# Patient Record
Sex: FEMALE | Race: BLACK OR AFRICAN AMERICAN | NOT HISPANIC OR LATINO | Employment: FULL TIME | RURAL
[De-identification: names, ages, dates, MRNs, and addresses within clinical notes are randomized per-mention and may not be internally consistent; named-entity substitution may affect disease eponyms.]

---

## 2019-12-18 ENCOUNTER — HISTORICAL (OUTPATIENT)
Dept: ADMINISTRATIVE | Facility: HOSPITAL | Age: 39
End: 2019-12-18

## 2019-12-23 LAB
LAB AP CLINICAL INFORMATION: NORMAL
LAB AP GENERAL CAT - HISTORICAL: NORMAL
LAB AP INTERPRETATION/RESULT - HISTORICAL: NORMAL
LAB AP SPECIMEN ADEQUACY - HISTORICAL: NORMAL
LAB AP SPECIMEN SUBMITTED - HISTORICAL: NORMAL

## 2020-01-03 LAB
HPV 16/18: NORMAL
HPV OTHER: NEGATIVE
PAP RECOMMENDATION EXT: NORMAL
THIN PREP: NORMAL

## 2020-06-09 ENCOUNTER — HISTORICAL (OUTPATIENT)
Dept: ADMINISTRATIVE | Facility: HOSPITAL | Age: 40
End: 2020-06-09

## 2020-07-16 ENCOUNTER — HISTORICAL (OUTPATIENT)
Dept: ADMINISTRATIVE | Facility: HOSPITAL | Age: 40
End: 2020-07-16

## 2020-07-16 LAB
ANION GAP SERPL CALCULATED.3IONS-SCNC: 10 MMOL/L
BASOPHILS # BLD AUTO: 0.02 X10E3/UL (ref 0–0.2)
BASOPHILS NFR BLD AUTO: 0.4 % (ref 0–1)
BUN SERPL-MCNC: 11 MG/DL (ref 7–18)
CALCIUM SERPL-MCNC: 8.4 MG/DL (ref 8.5–10.1)
CHLORIDE SERPL-SCNC: 105 MMOL/L (ref 98–107)
CO2 SERPL-SCNC: 26 MMOL/L (ref 21–32)
CREAT SERPL-MCNC: 1.06 MG/DL (ref 0.55–1.02)
EOSINOPHIL # BLD AUTO: 0.14 X10E3/UL (ref 0–0.5)
EOSINOPHIL NFR BLD AUTO: 2.5 % (ref 1–4)
ERYTHROCYTE [DISTWIDTH] IN BLOOD BY AUTOMATED COUNT: 12.2 % (ref 11.5–14.5)
GLUCOSE SERPL-MCNC: 89 MG/DL (ref 74–106)
HCT VFR BLD AUTO: 40 % (ref 38–47)
HGB BLD-MCNC: 13.7 G/DL (ref 12–16)
IMM GRANULOCYTES # BLD AUTO: 0.02 X10E3/UL (ref 0–0.04)
IMM GRANULOCYTES NFR BLD: 0.4 % (ref 0–0.4)
LYMPHOCYTES # BLD AUTO: 1.8 X10E3/UL (ref 1–4.8)
LYMPHOCYTES NFR BLD AUTO: 32.3 % (ref 27–41)
MCH RBC QN AUTO: 31.9 PG (ref 27–31)
MCHC RBC AUTO-ENTMCNC: 34.3 G/DL (ref 32–36)
MCV RBC AUTO: 93.2 FL (ref 80–96)
MONOCYTES # BLD AUTO: 0.71 X10E3/UL (ref 0–0.8)
MONOCYTES NFR BLD AUTO: 12.7 % (ref 2–6)
MPC BLD CALC-MCNC: 10.2 FL (ref 9.4–12.4)
NEUTROPHILS # BLD AUTO: 2.89 X10E3/UL (ref 1.8–7.7)
NEUTROPHILS NFR BLD AUTO: 51.7 % (ref 53–65)
NRBC # BLD AUTO: 0 X10E3/UL (ref 0–0)
NRBC, AUTO (.00): 0 /100 (ref 0–0)
PLATELET # BLD AUTO: 154 X10E3/UL (ref 150–400)
POTASSIUM SERPL-SCNC: 3.7 MMOL/L (ref 3.5–5.1)
RBC # BLD AUTO: 4.29 X10E6/UL (ref 4.2–5.4)
SODIUM SERPL-SCNC: 137 MMOL/L (ref 136–145)
WBC # BLD AUTO: 5.58 X10E3/UL (ref 4.5–11)

## 2020-07-17 ENCOUNTER — HISTORICAL (OUTPATIENT)
Dept: ADMINISTRATIVE | Facility: HOSPITAL | Age: 40
End: 2020-07-17

## 2020-09-15 ENCOUNTER — HISTORICAL (OUTPATIENT)
Dept: ADMINISTRATIVE | Facility: HOSPITAL | Age: 40
End: 2020-09-15

## 2020-09-15 LAB — SARS-COV+SARS-COV-2 AG RESP QL IA.RAPID: NEGATIVE

## 2021-03-16 ENCOUNTER — TELEPHONE (OUTPATIENT)
Dept: OBSTETRICS AND GYNECOLOGY | Facility: CLINIC | Age: 41
End: 2021-03-16

## 2021-10-07 ENCOUNTER — HOSPITAL ENCOUNTER (OUTPATIENT)
Dept: RADIOLOGY | Facility: HOSPITAL | Age: 41
Discharge: HOME | End: 2021-10-07
Payer: COMMERCIAL

## 2021-10-07 VITALS — BODY MASS INDEX: 27.99 KG/M2 | WEIGHT: 168 LBS | HEIGHT: 65 IN

## 2021-10-07 DIAGNOSIS — Z12.31 OTHER SCREENING MAMMOGRAM: ICD-10-CM

## 2021-10-07 PROCEDURE — 77067 SCR MAMMO BI INCL CAD: CPT | Mod: TC

## 2021-10-17 ENCOUNTER — HOSPITAL ENCOUNTER (EMERGENCY)
Facility: HOSPITAL | Age: 41
Discharge: HOME | End: 2021-10-17
Payer: COMMERCIAL

## 2021-10-17 VITALS
HEIGHT: 66 IN | OXYGEN SATURATION: 100 % | WEIGHT: 172 LBS | DIASTOLIC BLOOD PRESSURE: 82 MMHG | RESPIRATION RATE: 16 BRPM | HEART RATE: 78 BPM | SYSTOLIC BLOOD PRESSURE: 136 MMHG | TEMPERATURE: 98 F | BODY MASS INDEX: 27.64 KG/M2

## 2021-10-17 DIAGNOSIS — M79.605 LEFT LEG PAIN: ICD-10-CM

## 2021-10-17 LAB
ANION GAP SERPL CALCULATED.3IONS-SCNC: 11 MMOL/L (ref 7–16)
APTT PPP: 34.8 SECONDS (ref 25.2–37.3)
BASOPHILS # BLD AUTO: 0.02 K/UL (ref 0–0.2)
BASOPHILS NFR BLD AUTO: 0.3 % (ref 0–1)
BUN SERPL-MCNC: 12 MG/DL (ref 7–18)
BUN/CREAT SERPL: 13 (ref 6–20)
CALCIUM SERPL-MCNC: 8.6 MG/DL (ref 8.5–10.1)
CHLORIDE SERPL-SCNC: 107 MMOL/L (ref 98–107)
CO2 SERPL-SCNC: 29 MMOL/L (ref 21–32)
CREAT SERPL-MCNC: 0.89 MG/DL (ref 0.55–1.02)
DIFFERENTIAL METHOD BLD: ABNORMAL
EOSINOPHIL # BLD AUTO: 0.1 K/UL (ref 0–0.5)
EOSINOPHIL NFR BLD AUTO: 1.6 % (ref 1–4)
ERYTHROCYTE [DISTWIDTH] IN BLOOD BY AUTOMATED COUNT: 11.9 % (ref 11.5–14.5)
GLUCOSE SERPL-MCNC: 83 MG/DL (ref 74–106)
HCT VFR BLD AUTO: 38.5 % (ref 38–47)
HGB BLD-MCNC: 13.5 G/DL (ref 12–16)
IMM GRANULOCYTES # BLD AUTO: 0.01 K/UL (ref 0–0.04)
IMM GRANULOCYTES NFR BLD: 0.2 % (ref 0–0.4)
INR BLD: 0.97 (ref 0.9–1.1)
LYMPHOCYTES # BLD AUTO: 1.9 K/UL (ref 1–4.8)
LYMPHOCYTES NFR BLD AUTO: 30.3 % (ref 27–41)
MCH RBC QN AUTO: 31.3 PG (ref 27–31)
MCHC RBC AUTO-ENTMCNC: 35.1 G/DL (ref 32–36)
MCV RBC AUTO: 89.3 FL (ref 80–96)
MONOCYTES # BLD AUTO: 0.58 K/UL (ref 0–0.8)
MONOCYTES NFR BLD AUTO: 9.3 % (ref 2–6)
MPC BLD CALC-MCNC: 9.2 FL (ref 9.4–12.4)
NEUTROPHILS # BLD AUTO: 3.66 K/UL (ref 1.8–7.7)
NEUTROPHILS NFR BLD AUTO: 58.3 % (ref 53–65)
NRBC # BLD AUTO: 0 X10E3/UL
NRBC, AUTO (.00): 0 %
PLATELET # BLD AUTO: 149 K/UL (ref 150–400)
POTASSIUM SERPL-SCNC: 4.3 MMOL/L (ref 3.5–5.1)
PROTHROMBIN TIME: 12.9 SECONDS (ref 11.7–14.7)
RBC # BLD AUTO: 4.31 M/UL (ref 4.2–5.4)
SODIUM SERPL-SCNC: 143 MMOL/L (ref 136–145)
WBC # BLD AUTO: 6.27 K/UL (ref 4.5–11)

## 2021-10-17 PROCEDURE — 36415 COLL VENOUS BLD VENIPUNCTURE: CPT | Performed by: NURSE PRACTITIONER

## 2021-10-17 PROCEDURE — 99282 PR EMERGENCY DEPT VISIT,LEVEL II: ICD-10-PCS | Mod: ,,, | Performed by: NURSE PRACTITIONER

## 2021-10-17 PROCEDURE — 80048 BASIC METABOLIC PNL TOTAL CA: CPT | Performed by: NURSE PRACTITIONER

## 2021-10-17 PROCEDURE — 85610 PROTHROMBIN TIME: CPT | Performed by: NURSE PRACTITIONER

## 2021-10-17 PROCEDURE — 99282 EMERGENCY DEPT VISIT SF MDM: CPT | Mod: ,,, | Performed by: NURSE PRACTITIONER

## 2021-10-17 PROCEDURE — 85025 COMPLETE CBC W/AUTO DIFF WBC: CPT | Performed by: NURSE PRACTITIONER

## 2021-10-17 PROCEDURE — 99284 EMERGENCY DEPT VISIT MOD MDM: CPT | Mod: 25

## 2021-10-17 PROCEDURE — 85730 THROMBOPLASTIN TIME PARTIAL: CPT | Performed by: NURSE PRACTITIONER

## 2021-10-17 RX ORDER — NAPROXEN SODIUM 220 MG/1
81 TABLET, FILM COATED ORAL DAILY
COMMUNITY

## 2022-04-28 ENCOUNTER — OFFICE VISIT (OUTPATIENT)
Dept: FAMILY MEDICINE | Facility: CLINIC | Age: 42
End: 2022-04-28
Payer: COMMERCIAL

## 2022-04-28 VITALS
HEART RATE: 78 BPM | WEIGHT: 171 LBS | HEIGHT: 66 IN | BODY MASS INDEX: 27.48 KG/M2 | TEMPERATURE: 98 F | OXYGEN SATURATION: 99 % | SYSTOLIC BLOOD PRESSURE: 126 MMHG | RESPIRATION RATE: 18 BRPM | DIASTOLIC BLOOD PRESSURE: 72 MMHG

## 2022-04-28 DIAGNOSIS — K04.7 ABSCESSED TOOTH: Primary | ICD-10-CM

## 2022-04-28 PROCEDURE — 96372 THER/PROPH/DIAG INJ SC/IM: CPT | Mod: ,,, | Performed by: NURSE PRACTITIONER

## 2022-04-28 PROCEDURE — 96372 PR INJECTION,THERAP/PROPH/DIAG2ST, IM OR SUBCUT: ICD-10-PCS | Mod: ,,, | Performed by: NURSE PRACTITIONER

## 2022-04-28 PROCEDURE — 3008F BODY MASS INDEX DOCD: CPT | Mod: ,,, | Performed by: NURSE PRACTITIONER

## 2022-04-28 PROCEDURE — 99213 OFFICE O/P EST LOW 20 MIN: CPT | Mod: 25,,, | Performed by: NURSE PRACTITIONER

## 2022-04-28 PROCEDURE — 1159F PR MEDICATION LIST DOCUMENTED IN MEDICAL RECORD: ICD-10-PCS | Mod: ,,, | Performed by: NURSE PRACTITIONER

## 2022-04-28 PROCEDURE — 3074F PR MOST RECENT SYSTOLIC BLOOD PRESSURE < 130 MM HG: ICD-10-PCS | Mod: ,,, | Performed by: NURSE PRACTITIONER

## 2022-04-28 PROCEDURE — 1160F PR REVIEW ALL MEDS BY PRESCRIBER/CLIN PHARMACIST DOCUMENTED: ICD-10-PCS | Mod: ,,, | Performed by: NURSE PRACTITIONER

## 2022-04-28 PROCEDURE — 1159F MED LIST DOCD IN RCRD: CPT | Mod: ,,, | Performed by: NURSE PRACTITIONER

## 2022-04-28 PROCEDURE — 1160F RVW MEDS BY RX/DR IN RCRD: CPT | Mod: ,,, | Performed by: NURSE PRACTITIONER

## 2022-04-28 PROCEDURE — 3078F PR MOST RECENT DIASTOLIC BLOOD PRESSURE < 80 MM HG: ICD-10-PCS | Mod: ,,, | Performed by: NURSE PRACTITIONER

## 2022-04-28 PROCEDURE — 3008F PR BODY MASS INDEX (BMI) DOCUMENTED: ICD-10-PCS | Mod: ,,, | Performed by: NURSE PRACTITIONER

## 2022-04-28 PROCEDURE — 99213 PR OFFICE/OUTPT VISIT, EST, LEVL III, 20-29 MIN: ICD-10-PCS | Mod: 25,,, | Performed by: NURSE PRACTITIONER

## 2022-04-28 PROCEDURE — 3074F SYST BP LT 130 MM HG: CPT | Mod: ,,, | Performed by: NURSE PRACTITIONER

## 2022-04-28 PROCEDURE — 3078F DIAST BP <80 MM HG: CPT | Mod: ,,, | Performed by: NURSE PRACTITIONER

## 2022-04-28 RX ORDER — KETOROLAC TROMETHAMINE 30 MG/ML
60 INJECTION, SOLUTION INTRAMUSCULAR; INTRAVENOUS
Status: COMPLETED | OUTPATIENT
Start: 2022-04-28 | End: 2022-04-28

## 2022-04-28 RX ORDER — AMOXICILLIN 500 MG/1
500 TABLET, FILM COATED ORAL EVERY 12 HOURS
Qty: 21 TABLET | Refills: 0 | Status: SHIPPED | OUTPATIENT
Start: 2022-04-28 | End: 2022-05-08

## 2022-04-28 RX ORDER — CEFTRIAXONE 1 G/1
1 INJECTION, POWDER, FOR SOLUTION INTRAMUSCULAR; INTRAVENOUS
Status: COMPLETED | OUTPATIENT
Start: 2022-04-28 | End: 2022-04-28

## 2022-04-28 RX ADMIN — CEFTRIAXONE 1 G: 1 INJECTION, POWDER, FOR SOLUTION INTRAMUSCULAR; INTRAVENOUS at 02:04

## 2022-04-28 RX ADMIN — KETOROLAC TROMETHAMINE 60 MG: 30 INJECTION, SOLUTION INTRAMUSCULAR; INTRAVENOUS at 02:04

## 2022-04-28 NOTE — PROGRESS NOTES
Subjective:       Patient ID: Genoveva Ortiz is a 42 y.o. female.    Chief Complaint: Dental Pain    Ms. Ortiz presents to clinic with complaints of tooth pain with facial swelling onset 3-4 days ago, she is scheduled to see her dentist in one week. She reports difficulty sleeping and working due to pain in left jaw, left ear and left neck - she denies fever, chills, or drainage from the tooth.    Review of Systems   Constitutional: Negative for chills and fever.   HENT: Positive for dental problem, ear pain and facial swelling. Negative for drooling and mouth sores.    Respiratory: Negative.    Cardiovascular: Negative.    Gastrointestinal: Negative.    Genitourinary: Negative.    Neurological: Negative.    Psychiatric/Behavioral: Negative.          Objective:      Physical Exam  Vitals and nursing note reviewed.   Constitutional:       General: She is not in acute distress.     Appearance: Normal appearance.   HENT:      Head: Normocephalic.      Mouth/Throat:      Lips: Pink.      Mouth: Mucous membranes are moist. No oral lesions.      Dentition: Dental tenderness present. No gingival swelling, dental abscesses or gum lesions.      Tongue: No lesions.      Pharynx: Oropharynx is clear.   Eyes:      Conjunctiva/sclera: Conjunctivae normal.      Pupils: Pupils are equal, round, and reactive to light.   Neck:        Comments: Swelling and tenderness to palpation to bottom right molar area  Cardiovascular:      Rate and Rhythm: Normal rate and regular rhythm.      Pulses: Normal pulses.      Heart sounds: Normal heart sounds.   Pulmonary:      Effort: Pulmonary effort is normal.      Breath sounds: Normal breath sounds.   Abdominal:      General: Bowel sounds are normal.      Palpations: Abdomen is soft.   Musculoskeletal:         General: Normal range of motion.   Skin:     General: Skin is warm and dry.   Neurological:      Mental Status: She is alert and oriented to person, place, and time.    Psychiatric:         Behavior: Behavior normal.         Assessment:       Problem List Items Addressed This Visit    None     Visit Diagnoses     Abscessed tooth    -  Primary          Plan:         1. Rocephin 1 gm IM, Toradol 60mg IM today, start Amoxicillin 500mg TID with food. Keep DMD appointment in one week, RTC if symptoms worsen or persist.

## 2022-08-09 ENCOUNTER — OFFICE VISIT (OUTPATIENT)
Dept: OBSTETRICS AND GYNECOLOGY | Facility: CLINIC | Age: 42
End: 2022-08-09
Payer: COMMERCIAL

## 2022-08-09 VITALS — HEIGHT: 65 IN | DIASTOLIC BLOOD PRESSURE: 70 MMHG | SYSTOLIC BLOOD PRESSURE: 122 MMHG | BODY MASS INDEX: 28.46 KG/M2

## 2022-08-09 DIAGNOSIS — Z12.4 SCREENING FOR MALIGNANT NEOPLASM OF THE CERVIX: Primary | ICD-10-CM

## 2022-08-09 DIAGNOSIS — R87.610 ATYPICAL SQUAMOUS CELL CHANGES OF UNDETERMINED SIGNIFICANCE (ASCUS) ON CERVICAL CYTOLOGY WITH NEGATIVE HIGH RISK HUMAN PAPILLOMA VIRUS (HPV) TEST RESULT: ICD-10-CM

## 2022-08-09 PROCEDURE — 3008F PR BODY MASS INDEX (BMI) DOCUMENTED: ICD-10-PCS | Mod: ,,, | Performed by: OBSTETRICS & GYNECOLOGY

## 2022-08-09 PROCEDURE — 3074F SYST BP LT 130 MM HG: CPT | Mod: ,,, | Performed by: OBSTETRICS & GYNECOLOGY

## 2022-08-09 PROCEDURE — 1159F MED LIST DOCD IN RCRD: CPT | Mod: ,,, | Performed by: OBSTETRICS & GYNECOLOGY

## 2022-08-09 PROCEDURE — 3074F PR MOST RECENT SYSTOLIC BLOOD PRESSURE < 130 MM HG: ICD-10-PCS | Mod: ,,, | Performed by: OBSTETRICS & GYNECOLOGY

## 2022-08-09 PROCEDURE — 88142 CYTOPATH C/V THIN LAYER: CPT | Mod: GCY | Performed by: OBSTETRICS & GYNECOLOGY

## 2022-08-09 PROCEDURE — 87624 HPV HI-RISK TYP POOLED RSLT: CPT | Mod: ,,, | Performed by: CLINICAL MEDICAL LABORATORY

## 2022-08-09 PROCEDURE — 3078F DIAST BP <80 MM HG: CPT | Mod: ,,, | Performed by: OBSTETRICS & GYNECOLOGY

## 2022-08-09 PROCEDURE — 3078F PR MOST RECENT DIASTOLIC BLOOD PRESSURE < 80 MM HG: ICD-10-PCS | Mod: ,,, | Performed by: OBSTETRICS & GYNECOLOGY

## 2022-08-09 PROCEDURE — 3008F BODY MASS INDEX DOCD: CPT | Mod: ,,, | Performed by: OBSTETRICS & GYNECOLOGY

## 2022-08-09 PROCEDURE — 99402 PREV MED CNSL INDIV APPRX 30: CPT | Mod: S$PBB,,, | Performed by: OBSTETRICS & GYNECOLOGY

## 2022-08-09 PROCEDURE — 1159F PR MEDICATION LIST DOCUMENTED IN MEDICAL RECORD: ICD-10-PCS | Mod: ,,, | Performed by: OBSTETRICS & GYNECOLOGY

## 2022-08-09 PROCEDURE — 99213 OFFICE O/P EST LOW 20 MIN: CPT | Mod: PBBFAC | Performed by: OBSTETRICS & GYNECOLOGY

## 2022-08-09 PROCEDURE — 99402 PR PREVENT COUNSEL,INDIV,30 MIN: ICD-10-PCS | Mod: S$PBB,,, | Performed by: OBSTETRICS & GYNECOLOGY

## 2022-08-09 PROCEDURE — 87624 HUMAN PAPILLOMAVIRUS (HPV): ICD-10-PCS | Mod: ,,, | Performed by: CLINICAL MEDICAL LABORATORY

## 2022-08-09 NOTE — PROGRESS NOTES
Subjective:       Patient ID: Genoveva Ortiz is a 42 y.o. female.    Chief Complaint: Annual Exam (Here for check up  and pap.)    Presents for yearly gyn check.    She was last seen December 2019.  Ascus Pap however HPV negative at that time.    She has had an ablation in the past.  However last month she did have a light 3 day menses.    She will notify me if any irregular vaginal bleeding throughout the month or if any heavy vaginal bleeding occurs        Review of Systems      Objective:      Physical Exam  Exam conducted with a chaperone present.   HENT:      Head: Normocephalic.      Nose: Nose normal.   Eyes:      Pupils: Pupils are equal, round, and reactive to light.   Cardiovascular:      Rate and Rhythm: Normal rate.   Pulmonary:      Effort: Pulmonary effort is normal.      Breath sounds: Normal breath sounds.   Chest:      Comments: Breasts without palpable masses no skin retraction or nipple dimpling.  She had mammography screening September 2021 repeat scheduled  Abdominal:      General: Abdomen is flat.      Palpations: Abdomen is soft.   Genitourinary:     General: Normal vulva.      Vagina: Normal.      Cervix: Normal.      Uterus: Normal.       Adnexa: Right adnexa normal and left adnexa normal.      Rectum: Normal.      Comments: External normal vault normal cervix normal to appearance Pap taken bimanual exam revealed uterus normal size ovaries are nonpalpable on vaginal or rectovaginal examination no family history of colon cancer no history of rectal bleeding discussed beginning colon screening at age 46.   Musculoskeletal:         General: Normal range of motion.      Cervical back: Full passive range of motion without pain, normal range of motion and neck supple.   Skin:     General: Skin is dry.   Neurological:      General: No focal deficit present.      Mental Status: She is alert.   Psychiatric:         Attention and Perception: Attention normal.         Mood and Affect: Mood  normal.         Assessment:       1. Screening for malignant neoplasm of the cervix    2. Atypical squamous cell changes of undetermined significance (ASCUS) on cervical cytology with negative high risk human papilloma virus (HPV) test result        Plan:       Patient Instructions   Discussed normal gyn examination.    Discussed continuing yearly screening mammography    Began colon screening at age 46.    Routine glucose cholesterol testing by primary care provider.

## 2022-08-09 NOTE — PATIENT INSTRUCTIONS
Discussed normal gyn examination.    Discussed continuing yearly screening mammography    Began colon screening at age 46.    Routine glucose cholesterol testing by primary care provider.

## 2022-08-12 LAB
GH SERPL-MCNC: NORMAL NG/ML
INSULIN SERPL-ACNC: NORMAL U[IU]/ML
LAB AP CLINICAL INFORMATION: NORMAL
LAB AP GYN INTERPRETATION: NEGATIVE
LAB AP PAP DISCLAIMER COMMENTS: NORMAL
RENIN PLAS-CCNC: NORMAL NG/ML/H

## 2022-08-17 LAB
HPV 16: NEGATIVE
HPV 18: NEGATIVE
HPV OTHER: NEGATIVE

## 2022-10-12 ENCOUNTER — HOSPITAL ENCOUNTER (OUTPATIENT)
Dept: RADIOLOGY | Facility: HOSPITAL | Age: 42
Discharge: HOME | End: 2022-10-12
Attending: OBSTETRICS & GYNECOLOGY
Payer: COMMERCIAL

## 2022-10-12 VITALS — HEIGHT: 65 IN | WEIGHT: 179 LBS | BODY MASS INDEX: 29.82 KG/M2

## 2022-10-12 DIAGNOSIS — Z12.31 OTHER SCREENING MAMMOGRAM: ICD-10-CM

## 2022-10-12 PROCEDURE — 77067 SCR MAMMO BI INCL CAD: CPT | Mod: TC

## 2023-04-17 ENCOUNTER — OFFICE VISIT (OUTPATIENT)
Dept: VASCULAR SURGERY | Facility: CLINIC | Age: 43
End: 2023-04-17
Payer: COMMERCIAL

## 2023-04-17 ENCOUNTER — HOSPITAL ENCOUNTER (OUTPATIENT)
Dept: RADIOLOGY | Facility: HOSPITAL | Age: 43
Discharge: HOME | End: 2023-04-17
Attending: FAMILY MEDICINE
Payer: COMMERCIAL

## 2023-04-17 VITALS
DIASTOLIC BLOOD PRESSURE: 70 MMHG | RESPIRATION RATE: 14 BRPM | HEIGHT: 65 IN | WEIGHT: 174 LBS | SYSTOLIC BLOOD PRESSURE: 124 MMHG | HEART RATE: 76 BPM | BODY MASS INDEX: 28.99 KG/M2

## 2023-04-17 DIAGNOSIS — M79.605 LEFT LEG PAIN: ICD-10-CM

## 2023-04-17 DIAGNOSIS — R60.0 EDEMA, LOWER EXTREMITY: Primary | ICD-10-CM

## 2023-04-17 DIAGNOSIS — I87.1 MAY-THURNER SYNDROME: ICD-10-CM

## 2023-04-17 DIAGNOSIS — R60.0 EDEMA, LOWER EXTREMITY: ICD-10-CM

## 2023-04-17 PROCEDURE — 3078F PR MOST RECENT DIASTOLIC BLOOD PRESSURE < 80 MM HG: ICD-10-PCS | Mod: ,,, | Performed by: FAMILY MEDICINE

## 2023-04-17 PROCEDURE — 3008F BODY MASS INDEX DOCD: CPT | Mod: ,,, | Performed by: FAMILY MEDICINE

## 2023-04-17 PROCEDURE — 99203 PR OFFICE/OUTPT VISIT, NEW, LEVL III, 30-44 MIN: ICD-10-PCS | Mod: S$PBB,,, | Performed by: FAMILY MEDICINE

## 2023-04-17 PROCEDURE — 1160F PR REVIEW ALL MEDS BY PRESCRIBER/CLIN PHARMACIST DOCUMENTED: ICD-10-PCS | Mod: ,,, | Performed by: FAMILY MEDICINE

## 2023-04-17 PROCEDURE — 99203 OFFICE O/P NEW LOW 30 MIN: CPT | Mod: S$PBB,,, | Performed by: FAMILY MEDICINE

## 2023-04-17 PROCEDURE — 3074F SYST BP LT 130 MM HG: CPT | Mod: ,,, | Performed by: FAMILY MEDICINE

## 2023-04-17 PROCEDURE — 93971 EXTREMITY STUDY: CPT | Mod: TC

## 2023-04-17 PROCEDURE — 3078F DIAST BP <80 MM HG: CPT | Mod: ,,, | Performed by: FAMILY MEDICINE

## 2023-04-17 PROCEDURE — 99214 OFFICE O/P EST MOD 30 MIN: CPT | Mod: PBBFAC | Performed by: FAMILY MEDICINE

## 2023-04-17 PROCEDURE — 1159F PR MEDICATION LIST DOCUMENTED IN MEDICAL RECORD: ICD-10-PCS | Mod: ,,, | Performed by: FAMILY MEDICINE

## 2023-04-17 PROCEDURE — 3008F PR BODY MASS INDEX (BMI) DOCUMENTED: ICD-10-PCS | Mod: ,,, | Performed by: FAMILY MEDICINE

## 2023-04-17 PROCEDURE — 1160F RVW MEDS BY RX/DR IN RCRD: CPT | Mod: ,,, | Performed by: FAMILY MEDICINE

## 2023-04-17 PROCEDURE — 3074F PR MOST RECENT SYSTOLIC BLOOD PRESSURE < 130 MM HG: ICD-10-PCS | Mod: ,,, | Performed by: FAMILY MEDICINE

## 2023-04-17 PROCEDURE — 1159F MED LIST DOCD IN RCRD: CPT | Mod: ,,, | Performed by: FAMILY MEDICINE

## 2023-04-17 PROCEDURE — 93971 EXTREMITY STUDY: CPT | Mod: 26,,, | Performed by: FAMILY MEDICINE

## 2023-04-17 PROCEDURE — 93971 US VENOUS REFLUX STUDY UNILATERAL: ICD-10-PCS | Mod: 26,,, | Performed by: FAMILY MEDICINE

## 2023-04-17 NOTE — PROGRESS NOTES
[facility] CLINIC NOTE    Patient ID: Genoveva Ortiz is a 43 y.o. female.    I. HISTORY     Chief Complaint:   Chief Complaint   Patient presents with    Leg Swelling     Room 4. Leg swelling         HPI: Genoveva Ortiz is a 43 y.o. female who is referred here today by self-referral for evaluation of left lower extremity swelling and pain.  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  Patient underwent left iliac stenting on 07/16/2020 by Dr. Chase Novak, interventional Radiology.  The patient continued to have left lower extremity symptoms and saw Dr. Rollins, [facility], [place], where she underwent ballooning of her left iliac stent.  She states that symptoms were not improved by these measures.  She continues to be very active with almost daily exercise including cardio and weightlifting.  He states while she is active her leg feels much better, but then begins to swell and heart during inactive periods such as sitting or standing at work.  She continues to be intolerant to compression, she states that makes her symptoms worse.         Venous Disease Medical Necessity Documentation Initial Visit Date:  4/17/2023 Return Check Date:    Have you ever had a rupture or bleed from a varicose vein in your leg(s)?              [] Yes  [x] No   [] Yes   [] No   Have you ever been diagnosed with phlebitis, cellulitis, or inflammation in the area of the varicose veins of  your leg(s)?  [] Yes  [x] No    [] Yes   [] No   Do you have darkened or inflamed skin on your legs?   [] Yes   [x] No   [] Yes   [] No   Do you have leg swelling?     [x] Yes   [] No   [] Yes   [] No   Do you have leg pain?   [x] Yes   [] No   [] Yes   [] No   If yes, describe the type of pain?    []   Stabbing []  Radiating []  Aching   [x]  Tightness []  Throbbing               []  Burning []  Cramping              Do you have leg discomfort?   [x] Yes   [] No    [] Yes   [] No   If yes, describe the type of discomfort?    [x]  Heaviness [x]  Fullness   []  Restlessness [x] Tired/Fatigued [] Itching              Have you ever worn compression hose?   [] Yes   [x] No   [] Yes   [] No   If yes, how long?           Do you elevate your legs while sitting?   [] Yes   [x] No   [] Yes   [] No   Does venous disease (varicose veins, ulcers, skin changes, leg pain/swelling) interfere with your daily life?  If yes, check activities you are limited or unable to do.    []  Shower  []   Walk  []  Exercise  [] Play with children/grandchildren  []  Shop [] Work [] Stand for any period of time [] Sleep                               [x] Sitting for an extended period of time.           [x] Yes   [] No   [] Yes   [] No   Do you exercise/have you tried to exercise (i.e.  Walk our participate in a regular exercise routine)?  [x] Yes  [] No   [] Yes   [] No   BMI   28.9           Past Medical History:   Diagnosis Date    May-Thurner syndrome         Past Surgical History:   Procedure Laterality Date    ABLATION      AUGMENTATION OF BREAST      DILATION AND CURETTAGE OF UTERUS      Knee scope      1997    SALPINGECTOMY Bilateral 12/2016    Stent placement femoral vein Dr. Novak  07/16/2020    TONSILLECTOMY  1989    TUBAL LIGATION      VAGINAL DELIVERY      x 3    VASCULAR SURGERY Left 10/21/2021    Stent Balloon Dr. Lindo @ [facility]       Social History     Tobacco Use   Smoking Status Never   Smokeless Tobacco Never         Current Outpatient Medications:     aspirin 81 MG Chew, Take 81 mg by mouth once daily., Disp: , Rfl:     Review of Systems   Constitutional:  Negative for activity change, chills, diaphoresis, fatigue and fever.   Respiratory:  Negative for cough and shortness of breath.    Cardiovascular:  Positive for leg swelling. Negative for chest pain and claudication.        Hyperpigmentation LE   Gastrointestinal:  Negative for nausea and vomiting.   Musculoskeletal:   Positive for leg pain. Negative for joint swelling.   Integumentary:  Negative for rash and wound.   Neurological:  Negative for weakness and numbness.        II. PHYSICAL EXAM     Physical Exam  Constitutional:       General: She is awake. She is not in acute distress.     Appearance: Normal appearance. She is not ill-appearing or toxic-appearing.   HENT:      Head: Normocephalic and atraumatic.   Eyes:      Extraocular Movements: Extraocular movements intact.      Conjunctiva/sclera: Conjunctivae normal.      Pupils: Pupils are equal, round, and reactive to light.   Neck:      Vascular: No carotid bruit or JVD.   Cardiovascular:      Rate and Rhythm: Normal rate and regular rhythm.      Pulses:           Dorsalis pedis pulses are detected w/ Doppler on the right side and detected w/ Doppler on the left side.        Posterior tibial pulses are detected w/ Doppler on the right side and detected w/ Doppler on the left side.      Heart sounds: No murmur heard.  Pulmonary:      Effort: Pulmonary effort is normal. No respiratory distress.      Breath sounds: No stridor. No wheezing, rhonchi or rales.   Musculoskeletal:         General: No swelling, tenderness or deformity.      Right lower leg: No edema.      Left lower le+ Edema present.      Comments: No evidence of cellulitis, weeping or open ulceration.   Feet:      Comments: Triphasic handheld dopplerable pulses of the bilateral dorsalis pedis and posterior tibial arteries.  Skin:     General: Skin is warm.      Capillary Refill: Capillary refill takes less than 2 seconds.      Coloration: Skin is not ashen.      Findings: No bruising, erythema, lesion, rash or wound.   Neurological:      Mental Status: She is alert and oriented to person, place, and time.      Motor: No weakness.   Psychiatric:         Speech: Speech normal.         Behavior: Behavior normal. Behavior is cooperative.       Reticular/Spider veins noted:  RLE: none  LLE: none    Varicose veins  noted:  RLE: none  LLE:  none    CEAP Classification                       Venous Clinical Severity Score     III. ASSESSMENT & PLAN (MEDICAL DECISION MAKING)     1. Edema, lower extremity    2. May-Thurner syndrome    3. Left leg pain        Assessment/Diagnosis and Plan:  Patient has complaints, symptoms and physical exam findings of chronic venous disease.  Therefore, I will order a left complete venous reflux study and a left iliac ultrasound and see the patient back with results.    Orders Placed This Encounter    US Venous reflux Study, Unilateral    US DOPP Iliacs Bilateral          Bakari Galicia DO

## 2023-04-27 ENCOUNTER — OFFICE VISIT (OUTPATIENT)
Dept: VASCULAR SURGERY | Facility: CLINIC | Age: 43
End: 2023-04-27
Payer: COMMERCIAL

## 2023-04-27 ENCOUNTER — HOSPITAL ENCOUNTER (OUTPATIENT)
Dept: RADIOLOGY | Facility: HOSPITAL | Age: 43
Discharge: HOME | End: 2023-04-27
Attending: FAMILY MEDICINE
Payer: COMMERCIAL

## 2023-04-27 VITALS
DIASTOLIC BLOOD PRESSURE: 68 MMHG | BODY MASS INDEX: 28.72 KG/M2 | RESPIRATION RATE: 14 BRPM | SYSTOLIC BLOOD PRESSURE: 104 MMHG | HEART RATE: 64 BPM | HEIGHT: 65 IN | WEIGHT: 172.38 LBS

## 2023-04-27 DIAGNOSIS — M79.605 LEFT LEG PAIN: ICD-10-CM

## 2023-04-27 DIAGNOSIS — I87.1 MAY-THURNER SYNDROME: ICD-10-CM

## 2023-04-27 DIAGNOSIS — R60.0 EDEMA, LOWER EXTREMITY: ICD-10-CM

## 2023-04-27 DIAGNOSIS — I87.2 PERIPHERAL VENOUS INSUFFICIENCY: Primary | ICD-10-CM

## 2023-04-27 DIAGNOSIS — I87.2 VENOUS INSUFFICIENCY: ICD-10-CM

## 2023-04-27 DIAGNOSIS — I87.1 MAY-THURNER SYNDROME: Primary | ICD-10-CM

## 2023-04-27 PROCEDURE — 99213 OFFICE O/P EST LOW 20 MIN: CPT | Mod: PBBFAC,25 | Performed by: FAMILY MEDICINE

## 2023-04-27 PROCEDURE — 1159F MED LIST DOCD IN RCRD: CPT | Mod: ,,, | Performed by: FAMILY MEDICINE

## 2023-04-27 PROCEDURE — 1160F PR REVIEW ALL MEDS BY PRESCRIBER/CLIN PHARMACIST DOCUMENTED: ICD-10-PCS | Mod: ,,, | Performed by: FAMILY MEDICINE

## 2023-04-27 PROCEDURE — 99214 PR OFFICE/OUTPT VISIT, EST, LEVL IV, 30-39 MIN: ICD-10-PCS | Mod: S$PBB,,, | Performed by: FAMILY MEDICINE

## 2023-04-27 PROCEDURE — 1159F PR MEDICATION LIST DOCUMENTED IN MEDICAL RECORD: ICD-10-PCS | Mod: ,,, | Performed by: FAMILY MEDICINE

## 2023-04-27 PROCEDURE — 1160F RVW MEDS BY RX/DR IN RCRD: CPT | Mod: ,,, | Performed by: FAMILY MEDICINE

## 2023-04-27 PROCEDURE — 3078F PR MOST RECENT DIASTOLIC BLOOD PRESSURE < 80 MM HG: ICD-10-PCS | Mod: ,,, | Performed by: FAMILY MEDICINE

## 2023-04-27 PROCEDURE — 93978 VASCULAR STUDY: CPT | Mod: TC

## 2023-04-27 PROCEDURE — 3074F SYST BP LT 130 MM HG: CPT | Mod: ,,, | Performed by: FAMILY MEDICINE

## 2023-04-27 PROCEDURE — 99214 OFFICE O/P EST MOD 30 MIN: CPT | Mod: S$PBB,,, | Performed by: FAMILY MEDICINE

## 2023-04-27 PROCEDURE — 93978 VASCULAR STUDY: CPT | Mod: 26,,, | Performed by: RADIOLOGY

## 2023-04-27 PROCEDURE — 3074F PR MOST RECENT SYSTOLIC BLOOD PRESSURE < 130 MM HG: ICD-10-PCS | Mod: ,,, | Performed by: FAMILY MEDICINE

## 2023-04-27 PROCEDURE — 3078F DIAST BP <80 MM HG: CPT | Mod: ,,, | Performed by: FAMILY MEDICINE

## 2023-04-27 PROCEDURE — 93978 US DOPP ILIACS BILATERAL: ICD-10-PCS | Mod: 26,,, | Performed by: RADIOLOGY

## 2023-04-27 PROCEDURE — 3008F BODY MASS INDEX DOCD: CPT | Mod: ,,, | Performed by: FAMILY MEDICINE

## 2023-04-27 PROCEDURE — 3008F PR BODY MASS INDEX (BMI) DOCUMENTED: ICD-10-PCS | Mod: ,,, | Performed by: FAMILY MEDICINE

## 2023-04-27 RX ORDER — SODIUM CHLORIDE, SODIUM LACTATE, POTASSIUM CHLORIDE, CALCIUM CHLORIDE 600; 310; 30; 20 MG/100ML; MG/100ML; MG/100ML; MG/100ML
INJECTION, SOLUTION INTRAVENOUS CONTINUOUS
Status: CANCELLED | OUTPATIENT
Start: 2023-05-19

## 2023-04-27 NOTE — H&P (VIEW-ONLY)
[facility] CLINIC NOTE    Patient ID: Genoveva Ortiz is a 43 y.o. female.    I. HISTORY     Chief Complaint:   Chief Complaint   Patient presents with    Results     Exam room 3.  Bilateral Iliac ultrasound.        HPI: Genoveva Ortiz is a 43 y.o. female who presents today for follow-up and results to a bilateral iliac ultrasound and a left complete venous reflux study.  Venous reflux study performed on 04/17/2023, shows no evidence of DVT in the left lower extremity.  It also shows proximal reflux in the left great saphenous vein.  The iliac study, performed today, 04/27/2023, shows a patent left iliac stent.  No thrombus noted.  No significant narrowing or elevated velocities bilaterally.    Patient was seen recently on 04/17/2023 by self-referral for evaluation of left lower extremity swelling and pain.  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  Patient underwent left iliac stenting on 07/16/2020 by Dr. Chase Novak, interventional Radiology.  The patient continued to have left lower extremity symptoms and saw Dr. Rollins, [facility], [place], where she underwent ballooning of her left iliac stent.  She states that symptoms were not improved by these measures.  She continues to be very active with almost daily exercise including cardio and weightlifting.  He states while she is active her leg feels much better, but then begins to swell and hurt during inactive periods such as sitting or standing at work.  She continues to be intolerant to compression, she states that makes her symptoms worse.         Venous Disease Medical Necessity Documentation Initial Visit Date:  4/17/2023 Return Check Date:    Have you ever had a rupture or bleed from a varicose vein in your leg(s)?              [] Yes  [x] No   [] Yes   [] No   Have you ever been diagnosed with phlebitis, cellulitis, or inflammation in the area of the varicose  veins of  your leg(s)?  [] Yes  [x] No    [] Yes   [] No   Do you have darkened or inflamed skin on your legs?   [] Yes   [x] No   [] Yes   [] No   Do you have leg swelling?     [x] Yes   [] No   [] Yes   [] No   Do you have leg pain?   [x] Yes   [] No   [] Yes   [] No   If yes, describe the type of pain?    []   Stabbing []  Radiating []  Aching   [x]  Tightness []  Throbbing               []  Burning []  Cramping              Do you have leg discomfort?   [x] Yes   [] No   [] Yes   [] No   If yes, describe the type of discomfort?    [x]  Heaviness [x]  Fullness   []  Restlessness [x] Tired/Fatigued [] Itching              Have you ever worn compression hose?   [] Yes   [x] No   [] Yes   [] No   If yes, how long?           Do you elevate your legs while sitting?   [] Yes   [x] No   [] Yes   [] No   Does venous disease (varicose veins, ulcers, skin changes, leg pain/swelling) interfere with your daily life?  If yes, check activities you are limited or unable to do.    []  Shower  []   Walk  []  Exercise  [] Play with children/grandchildren  []  Shop [] Work [] Stand for any period of time [] Sleep                               [x] Sitting for an extended period of time.           [x] Yes   [] No   [] Yes   [] No   Do you exercise/have you tried to exercise (i.e.  Walk our participate in a regular exercise routine)?  [x] Yes  [] No   [] Yes   [] No   BMI   28.9           Past Medical History:   Diagnosis Date    May-Thurner syndrome         Past Surgical History:   Procedure Laterality Date    ABLATION      AUGMENTATION OF BREAST      DILATION AND CURETTAGE OF UTERUS      Knee scope      1997    SALPINGECTOMY Bilateral 12/2016    Stent placement femoral vein Dr. Novak  07/16/2020    TONSILLECTOMY  1989    TUBAL LIGATION      VAGINAL DELIVERY      x 3    VASCULAR SURGERY Left 10/21/2021    Stent Balloon Dr. Lindo @ [facility]       Social History     Tobacco Use   Smoking Status Never   Smokeless Tobacco Never          Current Outpatient Medications:     aspirin 81 MG Chew, Take 81 mg by mouth once daily., Disp: , Rfl:     Review of Systems   Constitutional:  Negative for activity change, chills, diaphoresis, fatigue and fever.   Respiratory:  Negative for cough and shortness of breath.    Cardiovascular:  Positive for leg swelling. Negative for chest pain and claudication.        Hyperpigmentation LE   Gastrointestinal:  Negative for nausea and vomiting.   Musculoskeletal:  Positive for leg pain. Negative for joint swelling.   Integumentary:  Negative for rash and wound.   Neurological:  Negative for weakness and numbness.        II. PHYSICAL EXAM     Physical Exam  Constitutional:       General: She is awake. She is not in acute distress.     Appearance: Normal appearance. She is not ill-appearing or toxic-appearing.   HENT:      Head: Normocephalic and atraumatic.   Eyes:      Extraocular Movements: Extraocular movements intact.      Conjunctiva/sclera: Conjunctivae normal.      Pupils: Pupils are equal, round, and reactive to light.   Neck:      Vascular: No carotid bruit or JVD.   Cardiovascular:      Rate and Rhythm: Normal rate and regular rhythm.      Pulses:           Dorsalis pedis pulses are detected w/ Doppler on the right side and detected w/ Doppler on the left side.        Posterior tibial pulses are detected w/ Doppler on the right side and detected w/ Doppler on the left side.      Heart sounds: No murmur heard.  Pulmonary:      Effort: Pulmonary effort is normal. No respiratory distress.      Breath sounds: No stridor. No wheezing, rhonchi or rales.   Musculoskeletal:         General: No swelling, tenderness or deformity.      Right lower leg: No edema.      Left lower le+ Edema present.      Comments: No evidence of cellulitis, weeping or open ulceration.   Feet:      Comments: Triphasic handheld dopplerable pulses of the bilateral dorsalis pedis and posterior tibial arteries.  Skin:     General:  Skin is warm.      Capillary Refill: Capillary refill takes less than 2 seconds.      Coloration: Skin is not ashen.      Findings: No bruising, erythema, lesion, rash or wound.   Neurological:      Mental Status: She is alert and oriented to person, place, and time.      Motor: No weakness.   Psychiatric:         Speech: Speech normal.         Behavior: Behavior normal. Behavior is cooperative.       Reticular/Spider veins noted:  RLE: none  LLE: none    Varicose veins noted:  RLE: none  LLE:  none    CEAP Classification  Clinical Signs: Class 3 - Edema  Etiologic Classification: Primary  Anatomic distribution: Superficial  Pathophysiologic dysfunction: Reflux                       Venous Clinical Severity Score  Pain:3=Daily, severe limiting activities or requiring regular use of analgesics  Varicose Veins: 0=None  Venous Edema: 2=Afternoon edema, above ankle  Pigmentation: 0=None or focal, low intensity (tan)  Inflammation: 0=None  Induration: 0=None  Number of Active Ulcers: 0=0  Active Ulceration, Duration: 0=None  Active Ulcer Size: 0=None  Compressive Therapy: 1=Intermittent use of stockings  Total Score: 6     III. ASSESSMENT & PLAN (MEDICAL DECISION MAKING)     1. May-Thurner syndrome    2. Edema, lower extremity    3. Left leg pain    4. Venous insufficiency      Assessment/Diagnosis and Plan:  The patient continues to have sequela of chronic venous insufficiency despite over 3 months of conservative therapy. The patient continues to have life altering symptoms as well as a positive reflux study as noted in the history of present illness above. At this time I believe it would be reasonable to proceed with a left great saphenous vein endovenous ablation for symptomatic venous insufficiency.  Untreated venous disease increases the patient's risk for cellulitis, deep vein thrombosis, chronic skin changes and venous ulceration.  Risk and benefits of the procedure were explained to the patient and the patient  wishes to proceed. The patient does not have overly distended veins and denies history of DVT/PE and will not require prophylactic anticoagulation.           Bakari Galicia, DO

## 2023-04-27 NOTE — PROGRESS NOTES
VEIN CENTER CLINIC NOTE    Patient ID: Genoveva Ortiz is a 43 y.o. female.    I. HISTORY     Chief Complaint:   Chief Complaint   Patient presents with    Results     Exam room 3.  Bilateral Iliac ultrasound.        HPI: Genoveva Ortiz is a 43 y.o. female who presents today for follow-up and results to a bilateral iliac ultrasound and a left complete venous reflux study.  Venous reflux study performed on 04/17/2023, shows no evidence of DVT in the left lower extremity.  It also shows proximal reflux in the left great saphenous vein.  The iliac study, performed today, 04/27/2023, shows a patent left iliac stent.  No thrombus noted.  No significant narrowing or elevated velocities bilaterally.    Patient was seen recently on 04/17/2023 by self-referral for evaluation of left lower extremity swelling and pain.  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  Patient underwent left iliac stenting on 07/16/2020 by Dr. Chase Novak, interventional Radiology.  The patient continued to have left lower extremity symptoms and saw Dr. Rollins, [facility], [place], where she underwent ballooning of her left iliac stent.  She states that symptoms were not improved by these measures.  She continues to be very active with almost daily exercise including cardio and weightlifting.  He states while she is active her leg feels much better, but then begins to swell and hurt during inactive periods such as sitting or standing at work.  She continues to be intolerant to compression, she states that makes her symptoms worse.         Venous Disease Medical Necessity Documentation Initial Visit Date:  4/17/2023 Return Check Date:    Have you ever had a rupture or bleed from a varicose vein in your leg(s)?              [] Yes  [x] No   [] Yes   [] No   Have you ever been diagnosed with phlebitis, cellulitis, or inflammation in the area of the varicose  veins of  your leg(s)?  [] Yes  [x] No    [] Yes   [] No   Do you have darkened or inflamed skin on your legs?   [] Yes   [x] No   [] Yes   [] No   Do you have leg swelling?     [x] Yes   [] No   [] Yes   [] No   Do you have leg pain?   [x] Yes   [] No   [] Yes   [] No   If yes, describe the type of pain?    []   Stabbing []  Radiating []  Aching   [x]  Tightness []  Throbbing               []  Burning []  Cramping              Do you have leg discomfort?   [x] Yes   [] No   [] Yes   [] No   If yes, describe the type of discomfort?    [x]  Heaviness [x]  Fullness   []  Restlessness [x] Tired/Fatigued [] Itching              Have you ever worn compression hose?   [] Yes   [x] No   [] Yes   [] No   If yes, how long?           Do you elevate your legs while sitting?   [] Yes   [x] No   [] Yes   [] No   Does venous disease (varicose veins, ulcers, skin changes, leg pain/swelling) interfere with your daily life?  If yes, check activities you are limited or unable to do.    []  Shower  []   Walk  []  Exercise  [] Play with children/grandchildren  []  Shop [] Work [] Stand for any period of time [] Sleep                               [x] Sitting for an extended period of time.           [x] Yes   [] No   [] Yes   [] No   Do you exercise/have you tried to exercise (i.e.  Walk our participate in a regular exercise routine)?  [x] Yes  [] No   [] Yes   [] No   BMI   28.9           Past Medical History:   Diagnosis Date    May-Thurner syndrome         Past Surgical History:   Procedure Laterality Date    ABLATION      AUGMENTATION OF BREAST      DILATION AND CURETTAGE OF UTERUS      Knee scope      1997    SALPINGECTOMY Bilateral 12/2016    Stent placement femoral vein Dr. Novak  07/16/2020    TONSILLECTOMY  1989    TUBAL LIGATION      VAGINAL DELIVERY      x 3    VASCULAR SURGERY Left 10/21/2021    Stent Balloon Dr. Lindo @ [facility]       Social History     Tobacco Use   Smoking Status Never   Smokeless Tobacco Never          Current Outpatient Medications:     aspirin 81 MG Chew, Take 81 mg by mouth once daily., Disp: , Rfl:     Review of Systems   Constitutional:  Negative for activity change, chills, diaphoresis, fatigue and fever.   Respiratory:  Negative for cough and shortness of breath.    Cardiovascular:  Positive for leg swelling. Negative for chest pain and claudication.        Hyperpigmentation LE   Gastrointestinal:  Negative for nausea and vomiting.   Musculoskeletal:  Positive for leg pain. Negative for joint swelling.   Integumentary:  Negative for rash and wound.   Neurological:  Negative for weakness and numbness.        II. PHYSICAL EXAM     Physical Exam  Constitutional:       General: She is awake. She is not in acute distress.     Appearance: Normal appearance. She is not ill-appearing or toxic-appearing.   HENT:      Head: Normocephalic and atraumatic.   Eyes:      Extraocular Movements: Extraocular movements intact.      Conjunctiva/sclera: Conjunctivae normal.      Pupils: Pupils are equal, round, and reactive to light.   Neck:      Vascular: No carotid bruit or JVD.   Cardiovascular:      Rate and Rhythm: Normal rate and regular rhythm.      Pulses:           Dorsalis pedis pulses are detected w/ Doppler on the right side and detected w/ Doppler on the left side.        Posterior tibial pulses are detected w/ Doppler on the right side and detected w/ Doppler on the left side.      Heart sounds: No murmur heard.  Pulmonary:      Effort: Pulmonary effort is normal. No respiratory distress.      Breath sounds: No stridor. No wheezing, rhonchi or rales.   Musculoskeletal:         General: No swelling, tenderness or deformity.      Right lower leg: No edema.      Left lower le+ Edema present.      Comments: No evidence of cellulitis, weeping or open ulceration.   Feet:      Comments: Triphasic handheld dopplerable pulses of the bilateral dorsalis pedis and posterior tibial arteries.  Skin:     General:  Skin is warm.      Capillary Refill: Capillary refill takes less than 2 seconds.      Coloration: Skin is not ashen.      Findings: No bruising, erythema, lesion, rash or wound.   Neurological:      Mental Status: She is alert and oriented to person, place, and time.      Motor: No weakness.   Psychiatric:         Speech: Speech normal.         Behavior: Behavior normal. Behavior is cooperative.       Reticular/Spider veins noted:  RLE: none  LLE: none    Varicose veins noted:  RLE: none  LLE:  none    CEAP Classification  Clinical Signs: Class 3 - Edema  Etiologic Classification: Primary  Anatomic distribution: Superficial  Pathophysiologic dysfunction: Reflux                       Venous Clinical Severity Score  Pain:3=Daily, severe limiting activities or requiring regular use of analgesics  Varicose Veins: 0=None  Venous Edema: 2=Afternoon edema, above ankle  Pigmentation: 0=None or focal, low intensity (tan)  Inflammation: 0=None  Induration: 0=None  Number of Active Ulcers: 0=0  Active Ulceration, Duration: 0=None  Active Ulcer Size: 0=None  Compressive Therapy: 1=Intermittent use of stockings  Total Score: 6     III. ASSESSMENT & PLAN (MEDICAL DECISION MAKING)     1. May-Thurner syndrome    2. Edema, lower extremity    3. Left leg pain    4. Venous insufficiency      Assessment/Diagnosis and Plan:  The patient continues to have sequela of chronic venous insufficiency despite over 3 months of conservative therapy. The patient continues to have life altering symptoms as well as a positive reflux study as noted in the history of present illness above. At this time I believe it would be reasonable to proceed with a left great saphenous vein endovenous ablation for symptomatic venous insufficiency.  Untreated venous disease increases the patient's risk for cellulitis, deep vein thrombosis, chronic skin changes and venous ulceration.  Risk and benefits of the procedure were explained to the patient and the patient  wishes to proceed. The patient does not have overly distended veins and denies history of DVT/PE and will not require prophylactic anticoagulation.           Bakari Galicia, DO

## 2023-05-19 ENCOUNTER — HOSPITAL ENCOUNTER (OUTPATIENT)
Facility: HOSPITAL | Age: 43
Discharge: HOME | End: 2023-05-19
Attending: FAMILY MEDICINE | Admitting: FAMILY MEDICINE
Payer: COMMERCIAL

## 2023-05-19 ENCOUNTER — ANESTHESIA EVENT (OUTPATIENT)
Dept: SURGERY | Facility: HOSPITAL | Age: 43
End: 2023-05-19
Payer: COMMERCIAL

## 2023-05-19 ENCOUNTER — ANESTHESIA (OUTPATIENT)
Dept: SURGERY | Facility: HOSPITAL | Age: 43
End: 2023-05-19
Payer: COMMERCIAL

## 2023-05-19 VITALS
OXYGEN SATURATION: 100 % | WEIGHT: 168 LBS | RESPIRATION RATE: 16 BRPM | TEMPERATURE: 98 F | SYSTOLIC BLOOD PRESSURE: 135 MMHG | DIASTOLIC BLOOD PRESSURE: 84 MMHG | BODY MASS INDEX: 27.99 KG/M2 | HEART RATE: 68 BPM | HEIGHT: 65 IN

## 2023-05-19 DIAGNOSIS — I87.2 PERIPHERAL VENOUS INSUFFICIENCY: ICD-10-CM

## 2023-05-19 DIAGNOSIS — I87.2 VENOUS INSUFFICIENCY: Primary | ICD-10-CM

## 2023-05-19 DIAGNOSIS — I87.2 PERIPHERAL VENOUS INSUFFICIENCY: Primary | ICD-10-CM

## 2023-05-19 PROCEDURE — D9220A PRA ANESTHESIA: Mod: CRNA,,, | Performed by: ANESTHESIOLOGY

## 2023-05-19 PROCEDURE — 36000705 HC OR TIME LEV I EA ADD 15 MIN: Performed by: FAMILY MEDICINE

## 2023-05-19 PROCEDURE — 25000003 PHARM REV CODE 250: Performed by: ANESTHESIOLOGY

## 2023-05-19 PROCEDURE — D9220A PRA ANESTHESIA: ICD-10-PCS | Mod: ANES,,, | Performed by: ANESTHESIOLOGY

## 2023-05-19 PROCEDURE — 63600175 PHARM REV CODE 636 W HCPCS: Performed by: ANESTHESIOLOGY

## 2023-05-19 PROCEDURE — 71000033 HC RECOVERY, INTIAL HOUR: Performed by: FAMILY MEDICINE

## 2023-05-19 PROCEDURE — 37000009 HC ANESTHESIA EA ADD 15 MINS: Performed by: FAMILY MEDICINE

## 2023-05-19 PROCEDURE — 36475 PR ENDOVENOUS RF, 1ST VEIN: ICD-10-PCS | Mod: LT,,, | Performed by: FAMILY MEDICINE

## 2023-05-19 PROCEDURE — 37000008 HC ANESTHESIA 1ST 15 MINUTES: Performed by: FAMILY MEDICINE

## 2023-05-19 PROCEDURE — 71000015 HC POSTOP RECOV 1ST HR: Performed by: FAMILY MEDICINE

## 2023-05-19 PROCEDURE — D9220A PRA ANESTHESIA: Mod: ANES,,, | Performed by: ANESTHESIOLOGY

## 2023-05-19 PROCEDURE — 36000704 HC OR TIME LEV I 1ST 15 MIN: Performed by: FAMILY MEDICINE

## 2023-05-19 PROCEDURE — 36475 ENDOVENOUS RF 1ST VEIN: CPT | Mod: LT,,, | Performed by: FAMILY MEDICINE

## 2023-05-19 PROCEDURE — D9220A PRA ANESTHESIA: ICD-10-PCS | Mod: CRNA,,, | Performed by: ANESTHESIOLOGY

## 2023-05-19 PROCEDURE — C1888 ENDOVAS NON-CARDIAC ABL CATH: HCPCS | Performed by: FAMILY MEDICINE

## 2023-05-19 RX ORDER — MIDAZOLAM HYDROCHLORIDE 1 MG/ML
INJECTION INTRAMUSCULAR; INTRAVENOUS
Status: DISCONTINUED | OUTPATIENT
Start: 2023-05-19 | End: 2023-05-19

## 2023-05-19 RX ORDER — LIDOCAINE HYDROCHLORIDE 20 MG/ML
INJECTION, SOLUTION EPIDURAL; INFILTRATION; INTRACAUDAL; PERINEURAL
Status: DISCONTINUED | OUTPATIENT
Start: 2023-05-19 | End: 2023-05-19

## 2023-05-19 RX ORDER — PROPOFOL 10 MG/ML
VIAL (ML) INTRAVENOUS
Status: DISCONTINUED | OUTPATIENT
Start: 2023-05-19 | End: 2023-05-19

## 2023-05-19 RX ORDER — SODIUM CHLORIDE, SODIUM LACTATE, POTASSIUM CHLORIDE, CALCIUM CHLORIDE 600; 310; 30; 20 MG/100ML; MG/100ML; MG/100ML; MG/100ML
125 INJECTION, SOLUTION INTRAVENOUS CONTINUOUS
Status: DISCONTINUED | OUTPATIENT
Start: 2023-05-19 | End: 2023-05-19 | Stop reason: HOSPADM

## 2023-05-19 RX ORDER — DIPHENHYDRAMINE HYDROCHLORIDE 50 MG/ML
25 INJECTION INTRAMUSCULAR; INTRAVENOUS EVERY 6 HOURS PRN
Status: DISCONTINUED | OUTPATIENT
Start: 2023-05-19 | End: 2023-05-19 | Stop reason: HOSPADM

## 2023-05-19 RX ORDER — SODIUM CHLORIDE, SODIUM LACTATE, POTASSIUM CHLORIDE, CALCIUM CHLORIDE 600; 310; 30; 20 MG/100ML; MG/100ML; MG/100ML; MG/100ML
INJECTION, SOLUTION INTRAVENOUS CONTINUOUS
Status: DISCONTINUED | OUTPATIENT
Start: 2023-05-19 | End: 2023-05-19 | Stop reason: HOSPADM

## 2023-05-19 RX ORDER — ONDANSETRON 2 MG/ML
4 INJECTION INTRAMUSCULAR; INTRAVENOUS DAILY PRN
Status: DISCONTINUED | OUTPATIENT
Start: 2023-05-19 | End: 2023-05-19 | Stop reason: HOSPADM

## 2023-05-19 RX ORDER — HYDROMORPHONE HYDROCHLORIDE 2 MG/ML
0.5 INJECTION, SOLUTION INTRAMUSCULAR; INTRAVENOUS; SUBCUTANEOUS EVERY 5 MIN PRN
Status: DISCONTINUED | OUTPATIENT
Start: 2023-05-19 | End: 2023-05-19 | Stop reason: HOSPADM

## 2023-05-19 RX ORDER — MORPHINE SULFATE 10 MG/ML
4 INJECTION INTRAMUSCULAR; INTRAVENOUS; SUBCUTANEOUS EVERY 5 MIN PRN
Status: DISCONTINUED | OUTPATIENT
Start: 2023-05-19 | End: 2023-05-19 | Stop reason: HOSPADM

## 2023-05-19 RX ORDER — MEPERIDINE HYDROCHLORIDE 25 MG/ML
25 INJECTION INTRAMUSCULAR; INTRAVENOUS; SUBCUTANEOUS EVERY 10 MIN PRN
Status: DISCONTINUED | OUTPATIENT
Start: 2023-05-19 | End: 2023-05-19 | Stop reason: HOSPADM

## 2023-05-19 RX ORDER — OXYCODONE HYDROCHLORIDE 5 MG/1
5 TABLET ORAL
Status: DISCONTINUED | OUTPATIENT
Start: 2023-05-19 | End: 2023-05-19 | Stop reason: HOSPADM

## 2023-05-19 RX ADMIN — PROPOFOL 50 MG: 10 INJECTION, EMULSION INTRAVENOUS at 12:05

## 2023-05-19 RX ADMIN — SODIUM CHLORIDE, POTASSIUM CHLORIDE, SODIUM LACTATE AND CALCIUM CHLORIDE: 600; 310; 30; 20 INJECTION, SOLUTION INTRAVENOUS at 12:05

## 2023-05-19 RX ADMIN — LIDOCAINE HYDROCHLORIDE 50 MG: 20 INJECTION, SOLUTION INTRAVENOUS at 12:05

## 2023-05-19 RX ADMIN — MIDAZOLAM 2 MG: 1 INJECTION INTRAMUSCULAR; INTRAVENOUS at 12:05

## 2023-05-19 NOTE — ANESTHESIA POSTPROCEDURE EVALUATION
Anesthesia Post Evaluation    Patient: Genoveva Myers [facility]    Procedure(s) Performed: Procedure(s) (LRB):  Left GSV RF Ablation (Left)    Final Anesthesia Type: MAC      Patient location during evaluation: PACU  Patient participation: Yes- Able to Participate  Level of consciousness: awake and sedated  Post-procedure vital signs: reviewed and stable  Pain management: adequate  Airway patency: patent    PONV status at discharge: No PONV  Anesthetic complications: no      Cardiovascular status: blood pressure returned to baseline  Respiratory status: unassisted  Hydration status: euvolemic  Follow-up not needed.          Vitals Value Taken Time   /84 05/19/23 1345   Temp 36.4 °C (97.6 °F) 05/19/23 1305   Pulse 69 05/19/23 1349   Resp 16 05/19/23 1335   SpO2 100 % 05/19/23 1349   Vitals shown include unvalidated device data.      Event Time   Out of Recovery 13:30:00         Pain/Chas Score: Chas Score: 10 (5/19/2023  1:38 PM)  Modified Chas Score: 20 (5/19/2023  1:54 PM)

## 2023-05-19 NOTE — ANESTHESIA PREPROCEDURE EVALUATION
05/19/2023  Genoveva Ortiz is a 43 y.o., female.      Pre-op Assessment    I have reviewed the Patient Summary Reports.     I have reviewed the Nursing Notes. I have reviewed the NPO Status.   I have reviewed the Medications.     Review of Systems  Anesthesia Hx:  No problems with previous Anesthesia    Social:  Non-Smoker, No Alcohol Use    Hematology/Oncology:  Hematology Normal   Oncology Normal     EENT/Dental:EENT/Dental Normal   Cardiovascular:  Cardiovascular Normal  May thurner syndrome - stent femoral A (dr Novak)   Pulmonary:  Pulmonary Normal    Renal/:  Renal/ Normal     Hepatic/GI:  Hepatic/GI Normal    Musculoskeletal:  Musculoskeletal Normal    Neurological:  Neurology Normal    Endocrine:  Endocrine Normal    Dermatological:  Skin Normal    Psych:  Psychiatric Normal           Physical Exam  General: Well nourished    Airway:  Mallampati: II / II  Mouth Opening: Normal  TM Distance: > 6 cm  Tongue: Normal  Neck ROM: Normal ROM    Chest/Lungs:  Clear to auscultation, Normal Respiratory Rate    Heart:  Rate: Normal  Rhythm: Regular Rhythm        Anesthesia Plan  Type of Anesthesia, risks & benefits discussed:    Anesthesia Type: MAC  Intra-op Monitoring Plan: Standard ASA Monitors  Post Op Pain Control Plan: multimodal analgesia  Induction:  IV  Informed Consent: Informed consent signed with the Patient and all parties understand the risks and agree with anesthesia plan.  All questions answered.   ASA Score: 2  Day of Surgery Review of History & Physical: H&P Update referred to the surgeon/provider.I have interviewed and examined the patient. I have reviewed the patient's H&P dated: There are no significant changes. H&P completed by Anesthesiologist.    Ready For Surgery From Anesthesia Perspective.     .

## 2023-05-19 NOTE — TRANSFER OF CARE
"Anesthesia Transfer of Care Note    Patient: Genoveva Ortiz    Procedure(s) Performed: Procedure(s) (LRB):  Left GSV RF Ablation (Left)    Patient location: PACU    Anesthesia Type: MAC    Transport from OR: Transported from OR on room air with adequate spontaneous ventilation    Post pain: adequate analgesia    Post assessment: no apparent anesthetic complications    Post vital signs: stable    Level of consciousness: responds to stimulation and sedated    Nausea/Vomiting: no nausea/vomiting    Complications: none    Transfer of care protocol was followed      Last vitals:   Visit Vitals  /68 (BP Location: Left arm, Patient Position: Lying)   Pulse 78   Temp 36.4 °C (97.6 °F) (Axillary)   Resp 17   Ht 5' 5" (1.651 m)   Wt 76.2 kg (168 lb)   SpO2 100%   Breastfeeding No   BMI 27.96 kg/m²     " H Plasty Text: Given the location of the defect, shape of the defect and the proximity to free margins a H-plasty was deemed most appropriate for repair.  Using a sterile surgical marker, the appropriate advancement arms of the H-plasty were drawn incorporating the defect and placing the expected incisions within the relaxed skin tension lines where possible. The area thus outlined was incised deep to adipose tissue with a #15 scalpel blade. The skin margins were undermined to an appropriate distance in all directions utilizing iris scissors.  The opposing advancement arms were then advanced into place in opposite direction and anchored with interrupted buried subcutaneous sutures.

## 2023-05-19 NOTE — OP NOTE
Date: 5/19/23  Surgeon: Dr Alejandro Galicia DO    Procedure:  Endovenous radio frequency ablation of the Left great saphenous vein(s) of the lower extremity.    Preprocedure and postprocedure diagnosis: Symptomatic varicose veins and venous insufficiency of the Left leg and other complications to include leg pain, leg edema and chronic skin changes.  The patient has previously failed conservative therapy consider a C3 chronic venous disease with venous hypertension.    Anesthesia: Local infiltration with monitored anesthesia care.  Estimated blood loss: 3 cc.  Specimens removed: none.   Complications: none.  Implants or grafts: none.    Findings:  A total of 8 cycles of radiofrequency ablation was used to treat 62.5 cm of vein over 2 minutes 40sec.  Maximum diameter of the vein treated is 5.0 mm with a maximum reflux time of 0.77seconds.    Procedure detail:  After patient identification, surgical site verification, and marked symptomatic Left leg, the patient was taken to the procedure room and placed in the supine position.  Monitored anesthesia care was induced.  The patient was prepped and draped in the normal sterile fashion leaving the Left leg exposed.  A time-out performed identifying the patient and the correct surgical site.  Tumescent anesthesia was then infiltrated at the site of expected cannulation of the Left great saphenous vein.  The Left great saphenous vein was then cannulated using the modified Seldinger technique to place a 6 English sheath.  I then advanced a 10x100 cm radiofrequency ablation catheter through the sheath up the saphenous vein to approximately 2 cm distal to the epigastric vein.  Tumescence anesthesia was then infiltrated around the vein to be treated.  The patient was placed in the head-down position.  Radiofrequency ablation of the Left great saphenous vein was then performed using a total of 8 cycles of radiofrequency ablation to treat 62.5 cm of vein over 2 minutes 40sec.  After  this was done the catheter and sheath were removed and noted to be intact.  Completion ultrasound revealed a widely patent femoral vein, sapheno femoral junction and an ablated great saphenous vein.  The patient's leg was then cleaned and dried.  Sterile dressings and wraps were applied.  The patient was transported to the recovery room in stable condition.

## 2023-05-19 NOTE — OR NURSING
1256 REC'D TO PACU IN STABLE COND. PT SLEEPING, WAKES TO VOICE. CONNECTED TO BP CUFF, EKG LEADS & SPO2 MONITORS. VS STABLE  PT HAD A LEFT GSV ABLATION TODAY. NO DISTRESS NOTED@ THIS TIME, WILL CONT TO MONITOR.        1330 TRANSFERRED PT BACK TO OUT PT ROOM 11 IN STABLE  COND. BEDSIDE REPORT GIVEN TO  DULCE MARIA MEJIA. NO DISTRESS NOTED@ THIS TIME. VS STABLE   100 % 73  123/71

## 2023-05-19 NOTE — DISCHARGE SUMMARY
[facility] - Periop Services  Discharge Note  Short Stay    Procedure(s) (LRB):  Left GSV RF Ablation (Left)      OUTCOME: Patient tolerated treatment/procedure well without complication and is now ready for discharge.    DISPOSITION: Home or Self Care    FINAL DIAGNOSIS:  Venous insufficiency    FOLLOWUP: In clinic    DISCHARGE INSTRUCTIONS:  No discharge procedures on file.      Clinical Reference Documents Added to Patient Instructions         Document    VEIN ABLATION (ENGLISH)            TIME SPENT ON DISCHARGE: 5 minutes

## 2023-05-25 ENCOUNTER — HOSPITAL ENCOUNTER (OUTPATIENT)
Dept: RADIOLOGY | Facility: HOSPITAL | Age: 43
Discharge: HOME | End: 2023-05-25
Attending: FAMILY MEDICINE
Payer: COMMERCIAL

## 2023-05-25 ENCOUNTER — OFFICE VISIT (OUTPATIENT)
Dept: VASCULAR SURGERY | Facility: CLINIC | Age: 43
End: 2023-05-25
Payer: COMMERCIAL

## 2023-05-25 VITALS
BODY MASS INDEX: 27.99 KG/M2 | SYSTOLIC BLOOD PRESSURE: 120 MMHG | WEIGHT: 168 LBS | HEART RATE: 65 BPM | HEIGHT: 65 IN | DIASTOLIC BLOOD PRESSURE: 65 MMHG | RESPIRATION RATE: 16 BRPM

## 2023-05-25 DIAGNOSIS — R60.0 EDEMA, LOWER EXTREMITY: ICD-10-CM

## 2023-05-25 DIAGNOSIS — I87.2 VENOUS INSUFFICIENCY: Primary | ICD-10-CM

## 2023-05-25 DIAGNOSIS — I87.2 PERIPHERAL VENOUS INSUFFICIENCY: ICD-10-CM

## 2023-05-25 DIAGNOSIS — I87.1 MAY-THURNER SYNDROME: ICD-10-CM

## 2023-05-25 PROCEDURE — 93971 US POST ABLATION VENOUS: ICD-10-PCS | Mod: 26,,, | Performed by: FAMILY MEDICINE

## 2023-05-25 PROCEDURE — 99214 OFFICE O/P EST MOD 30 MIN: CPT | Mod: S$PBB,,, | Performed by: FAMILY MEDICINE

## 2023-05-25 PROCEDURE — 3078F PR MOST RECENT DIASTOLIC BLOOD PRESSURE < 80 MM HG: ICD-10-PCS | Mod: ,,, | Performed by: FAMILY MEDICINE

## 2023-05-25 PROCEDURE — 3074F SYST BP LT 130 MM HG: CPT | Mod: ,,, | Performed by: FAMILY MEDICINE

## 2023-05-25 PROCEDURE — 3078F DIAST BP <80 MM HG: CPT | Mod: ,,, | Performed by: FAMILY MEDICINE

## 2023-05-25 PROCEDURE — 3074F PR MOST RECENT SYSTOLIC BLOOD PRESSURE < 130 MM HG: ICD-10-PCS | Mod: ,,, | Performed by: FAMILY MEDICINE

## 2023-05-25 PROCEDURE — 3008F BODY MASS INDEX DOCD: CPT | Mod: ,,, | Performed by: FAMILY MEDICINE

## 2023-05-25 PROCEDURE — 93971 EXTREMITY STUDY: CPT | Mod: TC

## 2023-05-25 PROCEDURE — 1159F MED LIST DOCD IN RCRD: CPT | Mod: ,,, | Performed by: FAMILY MEDICINE

## 2023-05-25 PROCEDURE — 99214 PR OFFICE/OUTPT VISIT, EST, LEVL IV, 30-39 MIN: ICD-10-PCS | Mod: S$PBB,,, | Performed by: FAMILY MEDICINE

## 2023-05-25 PROCEDURE — 1160F RVW MEDS BY RX/DR IN RCRD: CPT | Mod: ,,, | Performed by: FAMILY MEDICINE

## 2023-05-25 PROCEDURE — 1159F PR MEDICATION LIST DOCUMENTED IN MEDICAL RECORD: ICD-10-PCS | Mod: ,,, | Performed by: FAMILY MEDICINE

## 2023-05-25 PROCEDURE — 93971 EXTREMITY STUDY: CPT | Mod: 26,,, | Performed by: FAMILY MEDICINE

## 2023-05-25 PROCEDURE — 99213 OFFICE O/P EST LOW 20 MIN: CPT | Mod: PBBFAC,25 | Performed by: FAMILY MEDICINE

## 2023-05-25 PROCEDURE — 1160F PR REVIEW ALL MEDS BY PRESCRIBER/CLIN PHARMACIST DOCUMENTED: ICD-10-PCS | Mod: ,,, | Performed by: FAMILY MEDICINE

## 2023-05-25 PROCEDURE — 3008F PR BODY MASS INDEX (BMI) DOCUMENTED: ICD-10-PCS | Mod: ,,, | Performed by: FAMILY MEDICINE

## 2023-05-25 RX ORDER — LATANOPROST 50 UG/ML
1 SOLUTION/ DROPS OPHTHALMIC NIGHTLY
COMMUNITY
Start: 2023-05-01

## 2023-05-25 NOTE — PROGRESS NOTES
VEIN CENTER CLINIC NOTE    Patient ID: Genoveva Ortiz is a 43 y.o. female.    I. HISTORY     Chief Complaint:   Chief Complaint   Patient presents with    Follow-up     Room 4. Us 6d pa s/p Left GSV RF AB        HPI: Genoveva Ortiz is a 43 y.o. female who presents today for a 6 day follow-up after undergoing a left great saphenous vein radiofrequency ablation.  The patient states she did well over the weekend and the remainder of the week.  She feels as though she is doing well with less swelling, less heaviness and numbness tightness.  She has been wearing her thigh-high compression and walking appropriately.  She admits to some residual swelling but much improved.  She is very satisfied with her procedures so far.  She is post ablation ultrasound today reveals a widely patent common femoral vein and saphenofemoral junction.  Left great saphenous vein is ablated as expected.      Venous Disease Medical Necessity Documentation Initial Visit Date:  4/17/2023 Return Check Date:    Have you ever had a rupture or bleed from a varicose vein in your leg(s)?              [] Yes  [x] No   [] Yes   [] No   Have you ever been diagnosed with phlebitis, cellulitis, or inflammation in the area of the varicose veins of  your leg(s)?  [] Yes  [x] No    [] Yes   [] No   Do you have darkened or inflamed skin on your legs?   [] Yes   [x] No   [] Yes   [] No   Do you have leg swelling?     [x] Yes   [] No   [] Yes   [] No   Do you have leg pain?   [x] Yes   [] No   [] Yes   [] No   If yes, describe the type of pain?    []   Stabbing []  Radiating []  Aching   [x]  Tightness []  Throbbing               []  Burning []  Cramping              Do you have leg discomfort?   [x] Yes   [] No   [] Yes   [] No   If yes, describe the type of discomfort?    [x]  Heaviness [x]  Fullness   []  Restlessness [x] Tired/Fatigued [] Itching              Have you ever worn compression hose?   [] Yes   [x] No   [] Yes   [] No   If  yes, how long?           Do you elevate your legs while sitting?   [] Yes   [x] No   [] Yes   [] No   Does venous disease (varicose veins, ulcers, skin changes, leg pain/swelling) interfere with your daily life?  If yes, check activities you are limited or unable to do.    []  Shower  []   Walk  []  Exercise  [] Play with children/grandchildren  []  Shop [] Work [] Stand for any period of time [] Sleep                               [x] Sitting for an extended period of time.           [x] Yes   [] No   [] Yes   [] No   Do you exercise/have you tried to exercise (i.e.  Walk our participate in a regular exercise routine)?  [x] Yes  [] No   [] Yes   [] No   BMI   28.9           Past Medical History:   Diagnosis Date    May-Thurner syndrome         Past Surgical History:   Procedure Laterality Date    ABLATION      AUGMENTATION OF BREAST      DILATION AND CURETTAGE OF UTERUS      Knee scope      1997    RADIOFREQUENCY ABLATION Left 5/19/2023    Procedure: Left GSV RF Ablation;  Surgeon: Bakari Galicia DO;  Location: [facility];  Service: Peripheral Vascular;  Laterality: Left;    SALPINGECTOMY Bilateral 12/2016    Stent placement femoral vein Dr. Novak  07/16/2020    TONSILLECTOMY  1989    TUBAL LIGATION      VAGINAL DELIVERY      x 3    VASCULAR SURGERY Left 10/21/2021    Stent Balloon Dr. Lindo @ [facility]       Social History     Tobacco Use   Smoking Status Never   Smokeless Tobacco Never         Current Outpatient Medications:     aspirin 81 MG Chew, Take 81 mg by mouth once daily., Disp: , Rfl:     latanoprost 0.005 % ophthalmic solution, Place 1 drop into both eyes every evening., Disp: , Rfl:   No current facility-administered medications for this visit.    Facility-Administered Medications Ordered in Other Visits:     LIDOcaine-EPINEPHrine 1%-1:100,000 30 mL, LIDOcaine HCL 10 mg/ml (1%) 20 mL, sodium bicarbonate 10 mL in sodium chloride 0.9% 500 mL solution, , MISCELLANEOUS, Once, Bakari Galicia  DO    Review of Systems   Constitutional:  Negative for activity change, chills, diaphoresis, fatigue and fever.   Respiratory:  Negative for cough and shortness of breath.    Cardiovascular:  Positive for leg swelling. Negative for chest pain and claudication.        Hyperpigmentation LE   Gastrointestinal:  Negative for nausea and vomiting.   Musculoskeletal:  Positive for leg pain. Negative for joint swelling.   Integumentary:  Negative for rash and wound.   Neurological:  Negative for weakness and numbness.        II. PHYSICAL EXAM     Physical Exam  Constitutional:       General: She is awake. She is not in acute distress.     Appearance: Normal appearance. She is not ill-appearing or toxic-appearing.   HENT:      Head: Normocephalic and atraumatic.   Eyes:      Extraocular Movements: Extraocular movements intact.      Conjunctiva/sclera: Conjunctivae normal.      Pupils: Pupils are equal, round, and reactive to light.   Neck:      Vascular: No carotid bruit or JVD.   Cardiovascular:      Rate and Rhythm: Normal rate and regular rhythm.      Pulses:           Dorsalis pedis pulses are detected w/ Doppler on the right side and detected w/ Doppler on the left side.        Posterior tibial pulses are detected w/ Doppler on the right side and detected w/ Doppler on the left side.      Heart sounds: No murmur heard.  Pulmonary:      Effort: Pulmonary effort is normal. No respiratory distress.      Breath sounds: No stridor. No wheezing, rhonchi or rales.   Musculoskeletal:         General: No swelling, tenderness or deformity.      Right lower leg: No edema.      Left lower le+ Edema present.      Comments: No evidence of cellulitis, weeping or open ulceration.   Feet:      Comments: Triphasic handheld dopplerable pulses of the bilateral dorsalis pedis and posterior tibial arteries.  Skin:     General: Skin is warm.      Capillary Refill: Capillary refill takes less than 2 seconds.      Coloration: Skin is not  sera.      Findings: No bruising, erythema, lesion, rash or wound.   Neurological:      Mental Status: She is alert and oriented to person, place, and time.      Motor: No weakness.   Psychiatric:         Speech: Speech normal.         Behavior: Behavior normal. Behavior is cooperative.       Reticular/Spider veins noted:  RLE: none  LLE: none    Varicose veins noted:  RLE: none  LLE:  none    CEAP Classification  Clinical Signs: Class 3 - Edema  Etiologic Classification: Primary  Anatomic distribution: Superficial  Pathophysiologic dysfunction: Reflux                       Venous Clinical Severity Score  Pain:3=Daily, severe limiting activities or requiring regular use of analgesics  Varicose Veins: 0=None  Venous Edema: 2=Afternoon edema, above ankle  Pigmentation: 0=None or focal, low intensity (tan)  Inflammation: 0=None  Induration: 0=None  Number of Active Ulcers: 0=0  Active Ulceration, Duration: 0=None  Active Ulcer Size: 0=None  Compressive Therapy: 3=Full compliance, stockings + elevation  Total Score: 8     III. ASSESSMENT & PLAN (MEDICAL DECISION MAKING)     1. Venous insufficiency    2. May-Thurner syndrome    3. Edema, lower extremity      Assessment/Diagnosis and Plan:  Patient doing well post ablation and encouraged to continue wearing her thigh-high compression for the next 2 weeks.  She may then wear her knee-high compression socks daily along with leg exercise and leg elevation.          Bakari Galicia,

## 2023-05-30 ENCOUNTER — OFFICE VISIT (OUTPATIENT)
Dept: VASCULAR SURGERY | Facility: CLINIC | Age: 43
End: 2023-05-30
Payer: COMMERCIAL

## 2023-05-30 VITALS
WEIGHT: 175 LBS | HEIGHT: 65 IN | DIASTOLIC BLOOD PRESSURE: 60 MMHG | HEART RATE: 76 BPM | SYSTOLIC BLOOD PRESSURE: 120 MMHG | BODY MASS INDEX: 29.16 KG/M2 | RESPIRATION RATE: 18 BRPM

## 2023-05-30 DIAGNOSIS — R60.0 EDEMA, LOWER EXTREMITY: ICD-10-CM

## 2023-05-30 DIAGNOSIS — M79.605 LEFT LEG PAIN: ICD-10-CM

## 2023-05-30 DIAGNOSIS — I87.1 MAY-THURNER SYNDROME: Primary | ICD-10-CM

## 2023-05-30 DIAGNOSIS — I87.2 VENOUS INSUFFICIENCY: ICD-10-CM

## 2023-05-30 DIAGNOSIS — I87.2 PERIPHERAL VENOUS INSUFFICIENCY: ICD-10-CM

## 2023-05-30 PROCEDURE — 3074F PR MOST RECENT SYSTOLIC BLOOD PRESSURE < 130 MM HG: ICD-10-PCS | Mod: ,,, | Performed by: FAMILY MEDICINE

## 2023-05-30 PROCEDURE — 1160F RVW MEDS BY RX/DR IN RCRD: CPT | Mod: ,,, | Performed by: FAMILY MEDICINE

## 2023-05-30 PROCEDURE — 3008F BODY MASS INDEX DOCD: CPT | Mod: ,,, | Performed by: FAMILY MEDICINE

## 2023-05-30 PROCEDURE — 99213 OFFICE O/P EST LOW 20 MIN: CPT | Mod: PBBFAC | Performed by: FAMILY MEDICINE

## 2023-05-30 PROCEDURE — 99214 PR OFFICE/OUTPT VISIT, EST, LEVL IV, 30-39 MIN: ICD-10-PCS | Mod: S$PBB,,, | Performed by: FAMILY MEDICINE

## 2023-05-30 PROCEDURE — 99214 OFFICE O/P EST MOD 30 MIN: CPT | Mod: S$PBB,,, | Performed by: FAMILY MEDICINE

## 2023-05-30 PROCEDURE — 1159F PR MEDICATION LIST DOCUMENTED IN MEDICAL RECORD: ICD-10-PCS | Mod: ,,, | Performed by: FAMILY MEDICINE

## 2023-05-30 PROCEDURE — 3078F PR MOST RECENT DIASTOLIC BLOOD PRESSURE < 80 MM HG: ICD-10-PCS | Mod: ,,, | Performed by: FAMILY MEDICINE

## 2023-05-30 PROCEDURE — 1159F MED LIST DOCD IN RCRD: CPT | Mod: ,,, | Performed by: FAMILY MEDICINE

## 2023-05-30 PROCEDURE — 3008F PR BODY MASS INDEX (BMI) DOCUMENTED: ICD-10-PCS | Mod: ,,, | Performed by: FAMILY MEDICINE

## 2023-05-30 PROCEDURE — 3074F SYST BP LT 130 MM HG: CPT | Mod: ,,, | Performed by: FAMILY MEDICINE

## 2023-05-30 PROCEDURE — 3078F DIAST BP <80 MM HG: CPT | Mod: ,,, | Performed by: FAMILY MEDICINE

## 2023-05-30 PROCEDURE — 1160F PR REVIEW ALL MEDS BY PRESCRIBER/CLIN PHARMACIST DOCUMENTED: ICD-10-PCS | Mod: ,,, | Performed by: FAMILY MEDICINE

## 2023-05-30 RX ORDER — PREDNISONE 20 MG/1
20 TABLET ORAL DAILY
Qty: 7 TABLET | Refills: 0 | Status: ON HOLD | OUTPATIENT
Start: 2023-05-30 | End: 2023-12-18

## 2023-05-30 NOTE — PROGRESS NOTES
[facility] CLINIC NOTE    Patient ID: Genoveva Ortiz is a 43 y.o. female.    I. HISTORY     Chief Complaint:   Chief Complaint   Patient presents with    Follow-up     Room 2. LEFT LEG PAIN/BURNING S/P 11 DAYS LEFT GSV RF AB        HPI: Genoveva Ortiz is a 43 y.o. female who presents today at 11 days post ablation of the left great saphenous vein.  The patient states she has a burning sensation running from her medial ankle running medially all the way to her groin.  She states it is a burning-type sensation that is tender to touch.  No red streak or increased warmth in this area.  She states it feels similar to when she had a facet procedure performed by her pain medicine doctor a couple of years ago.  There is no increased swelling.  She denies fever, sweats or chills.  She states that happened suddenly 2 days ago.    Venous Disease Medical Necessity Documentation Initial Visit Date:  4/17/2023 Return Check Date:    Have you ever had a rupture or bleed from a varicose vein in your leg(s)?              [] Yes  [x] No   [] Yes   [] No   Have you ever been diagnosed with phlebitis, cellulitis, or inflammation in the area of the varicose veins of  your leg(s)?  [] Yes  [x] No    [] Yes   [] No   Do you have darkened or inflamed skin on your legs?   [] Yes   [x] No   [] Yes   [] No   Do you have leg swelling?     [x] Yes   [] No   [] Yes   [] No   Do you have leg pain?   [x] Yes   [] No   [] Yes   [] No   If yes, describe the type of pain?    []   Stabbing []  Radiating []  Aching   [x]  Tightness []  Throbbing               []  Burning []  Cramping              Do you have leg discomfort?   [x] Yes   [] No   [] Yes   [] No   If yes, describe the type of discomfort?    [x]  Heaviness [x]  Fullness   []  Restlessness [x] Tired/Fatigued [] Itching              Have you ever worn compression hose?   [] Yes   [x] No   [] Yes   [] No   If yes, how long?           Do you elevate your legs while  sitting?   [] Yes   [x] No   [] Yes   [] No   Does venous disease (varicose veins, ulcers, skin changes, leg pain/swelling) interfere with your daily life?  If yes, check activities you are limited or unable to do.    []  Shower  []   Walk  []  Exercise  [] Play with children/grandchildren  []  Shop [] Work [] Stand for any period of time [] Sleep                               [x] Sitting for an extended period of time.           [x] Yes   [] No   [] Yes   [] No   Do you exercise/have you tried to exercise (i.e.  Walk our participate in a regular exercise routine)?  [x] Yes  [] No   [] Yes   [] No   BMI   28.9           Past Medical History:   Diagnosis Date    May-Thurner syndrome         Past Surgical History:   Procedure Laterality Date    ABLATION      AUGMENTATION OF BREAST      DILATION AND CURETTAGE OF UTERUS      Knee scope      1997    RADIOFREQUENCY ABLATION Left 5/19/2023    Procedure: Left GSV RF Ablation;  Surgeon: Bakari Galicia DO;  Location: [facility];  Service: Peripheral Vascular;  Laterality: Left;    SALPINGECTOMY Bilateral 12/2016    Stent placement femoral vein Dr. Novak  07/16/2020    TONSILLECTOMY  1989    TUBAL LIGATION      VAGINAL DELIVERY      x 3    VASCULAR SURGERY Left 10/21/2021    Stent Balloon Dr. Lindo @ [facility]       Social History     Tobacco Use   Smoking Status Never   Smokeless Tobacco Never         Current Outpatient Medications:     aspirin 81 MG Chew, Take 81 mg by mouth once daily., Disp: , Rfl:     latanoprost 0.005 % ophthalmic solution, Place 1 drop into both eyes every evening., Disp: , Rfl:   No current facility-administered medications for this visit.    Facility-Administered Medications Ordered in Other Visits:     LIDOcaine-EPINEPHrine 1%-1:100,000 30 mL, LIDOcaine HCL 10 mg/ml (1%) 20 mL, sodium bicarbonate 10 mL in sodium chloride 0.9% 500 mL solution, , MISCELLANEOUS, Once, Bakari Galicia DO    Review of Systems   Constitutional:  Negative for  activity change, chills, diaphoresis, fatigue and fever.   Respiratory:  Negative for cough and shortness of breath.    Cardiovascular:  Positive for leg swelling. Negative for chest pain and claudication.        Hyperpigmentation LE   Gastrointestinal:  Negative for nausea and vomiting.   Musculoskeletal:  Positive for leg pain. Negative for joint swelling.   Integumentary:  Negative for rash and wound.   Neurological:  Negative for weakness and numbness.        II. PHYSICAL EXAM     Physical Exam  Constitutional:       General: She is awake. She is not in acute distress.     Appearance: Normal appearance. She is not ill-appearing or toxic-appearing.   HENT:      Head: Normocephalic and atraumatic.   Eyes:      Extraocular Movements: Extraocular movements intact.      Conjunctiva/sclera: Conjunctivae normal.      Pupils: Pupils are equal, round, and reactive to light.   Neck:      Vascular: No carotid bruit or JVD.   Cardiovascular:      Rate and Rhythm: Normal rate and regular rhythm.      Pulses:           Dorsalis pedis pulses are detected w/ Doppler on the right side and detected w/ Doppler on the left side.        Posterior tibial pulses are detected w/ Doppler on the right side and detected w/ Doppler on the left side.      Heart sounds: No murmur heard.  Pulmonary:      Effort: Pulmonary effort is normal. No respiratory distress.      Breath sounds: No stridor. No wheezing, rhonchi or rales.   Musculoskeletal:         General: No swelling, tenderness or deformity.      Right lower leg: No edema.      Left lower le+ Edema present.      Comments: No evidence of cellulitis, weeping or open ulceration.   Feet:      Comments: Triphasic handheld dopplerable pulses of the bilateral dorsalis pedis and posterior tibial arteries.  Skin:     General: Skin is warm.      Capillary Refill: Capillary refill takes less than 2 seconds.      Coloration: Skin is not ashen.      Findings: No bruising, erythema, lesion, rash  or wound.   Neurological:      Mental Status: She is alert and oriented to person, place, and time.      Motor: No weakness.   Psychiatric:         Speech: Speech normal.         Behavior: Behavior normal. Behavior is cooperative.       Reticular/Spider veins noted:  RLE: none  LLE: none    Varicose veins noted:  RLE: none  LLE:  none    CEAP Classification  Clinical Signs: Class 3 - Edema  Etiologic Classification: Primary  Anatomic distribution: Superficial  Pathophysiologic dysfunction: Reflux                       Venous Clinical Severity Score  Pain:3=Daily, severe limiting activities or requiring regular use of analgesics  Varicose Veins: 0=None  Venous Edema: 2=Afternoon edema, above ankle  Pigmentation: 0=None or focal, low intensity (tan)  Inflammation: 0=None  Induration: 0=None  Number of Active Ulcers: 0=0  Active Ulceration, Duration: 0=None  Active Ulcer Size: 0=None  Compressive Therapy: 3=Full compliance, stockings + elevation  Total Score: 8     III. ASSESSMENT & PLAN (MEDICAL DECISION MAKING)     1. May-Thurner syndrome    2. Peripheral venous insufficiency    3. Left leg pain    4. Venous insufficiency    5. Edema, lower extremity        Assessment/Diagnosis and Plan:  No signs of infection.  Possibly an inflammatory reaction.  Will start the patient on prednisone 20 mg q.d. for the next 7 days.  Patient to keep her one-month follow-up.  She is to call back if        Bakari Galicia,

## 2023-09-13 ENCOUNTER — HOSPITAL ENCOUNTER (OUTPATIENT)
Dept: RADIOLOGY | Facility: HOSPITAL | Age: 43
Discharge: HOME | End: 2023-09-13
Attending: OBSTETRICS & GYNECOLOGY
Payer: COMMERCIAL

## 2023-09-13 ENCOUNTER — TELEPHONE (OUTPATIENT)
Dept: OBSTETRICS AND GYNECOLOGY | Facility: CLINIC | Age: 43
End: 2023-09-13
Payer: COMMERCIAL

## 2023-09-13 ENCOUNTER — HOSPITAL ENCOUNTER (OUTPATIENT)
Facility: HOSPITAL | Age: 43
LOS: 1 days | Discharge: HOME | End: 2023-09-14
Attending: OBSTETRICS & GYNECOLOGY | Admitting: OBSTETRICS & GYNECOLOGY
Payer: COMMERCIAL

## 2023-09-13 ENCOUNTER — OFFICE VISIT (OUTPATIENT)
Dept: OBSTETRICS AND GYNECOLOGY | Facility: CLINIC | Age: 43
End: 2023-09-13
Payer: COMMERCIAL

## 2023-09-13 VITALS — SYSTOLIC BLOOD PRESSURE: 120 MMHG | TEMPERATURE: 98 F | DIASTOLIC BLOOD PRESSURE: 68 MMHG

## 2023-09-13 DIAGNOSIS — R10.2 PELVIC PAIN: ICD-10-CM

## 2023-09-13 DIAGNOSIS — R10.30 LOWER ABDOMINAL PAIN: ICD-10-CM

## 2023-09-13 DIAGNOSIS — R10.30 LOWER ABDOMINAL PAIN: Primary | ICD-10-CM

## 2023-09-13 LAB
ALBUMIN SERPL BCP-MCNC: 3.8 G/DL (ref 3.5–5)
ALBUMIN/GLOB SERPL: 1.4 {RATIO}
ALP SERPL-CCNC: 52 U/L (ref 37–98)
ALT SERPL W P-5'-P-CCNC: 24 U/L (ref 13–56)
ANION GAP SERPL CALCULATED.3IONS-SCNC: 7 MMOL/L (ref 7–16)
AST SERPL W P-5'-P-CCNC: 22 U/L (ref 15–37)
BASOPHILS # BLD AUTO: 0.02 K/UL (ref 0–0.2)
BASOPHILS NFR BLD AUTO: 0.3 % (ref 0–1)
BILIRUB SERPL-MCNC: 0.6 MG/DL (ref ?–1.2)
BUN SERPL-MCNC: 16 MG/DL (ref 7–18)
BUN/CREAT SERPL: 14 (ref 6–20)
CALCIUM SERPL-MCNC: 8.8 MG/DL (ref 8.5–10.1)
CHLORIDE SERPL-SCNC: 103 MMOL/L (ref 98–107)
CO2 SERPL-SCNC: 28 MMOL/L (ref 21–32)
CREAT SERPL-MCNC: 1.16 MG/DL (ref 0.55–1.02)
DIFFERENTIAL METHOD BLD: ABNORMAL
EGFR (NO RACE VARIABLE) (RUSH/TITUS): 60 ML/MIN/1.73M2
EOSINOPHIL # BLD AUTO: 0.06 K/UL (ref 0–0.5)
EOSINOPHIL NFR BLD AUTO: 0.9 % (ref 1–4)
ERYTHROCYTE [DISTWIDTH] IN BLOOD BY AUTOMATED COUNT: 12 % (ref 11.5–14.5)
GLOBULIN SER-MCNC: 2.8 G/DL (ref 2–4)
GLUCOSE SERPL-MCNC: 138 MG/DL (ref 74–106)
HCG SERUM QUALITATIVE: NEGATIVE
HCT VFR BLD AUTO: 36.5 % (ref 38–47)
HGB BLD-MCNC: 12.8 G/DL (ref 12–16)
IMM GRANULOCYTES # BLD AUTO: 0.02 K/UL (ref 0–0.04)
IMM GRANULOCYTES NFR BLD: 0.3 % (ref 0–0.4)
LYMPHOCYTES # BLD AUTO: 1.96 K/UL (ref 1–4.8)
LYMPHOCYTES NFR BLD AUTO: 29.5 % (ref 27–41)
MCH RBC QN AUTO: 31.5 PG (ref 27–31)
MCHC RBC AUTO-ENTMCNC: 35.1 G/DL (ref 32–36)
MCV RBC AUTO: 89.9 FL (ref 80–96)
MONOCYTES # BLD AUTO: 0.61 K/UL (ref 0–0.8)
MONOCYTES NFR BLD AUTO: 9.2 % (ref 2–6)
MPC BLD CALC-MCNC: 10.2 FL (ref 9.4–12.4)
NEUTROPHILS # BLD AUTO: 3.98 K/UL (ref 1.8–7.7)
NEUTROPHILS NFR BLD AUTO: 59.8 % (ref 53–65)
NRBC # BLD AUTO: 0 X10E3/UL
NRBC, AUTO (.00): 0 %
PLATELET # BLD AUTO: 144 K/UL (ref 150–400)
POTASSIUM SERPL-SCNC: 3.3 MMOL/L (ref 3.5–5.1)
PROT SERPL-MCNC: 6.6 G/DL (ref 6.4–8.2)
RBC # BLD AUTO: 4.06 M/UL (ref 4.2–5.4)
SODIUM SERPL-SCNC: 135 MMOL/L (ref 136–145)
WBC # BLD AUTO: 6.65 K/UL (ref 4.5–11)

## 2023-09-13 PROCEDURE — 3078F PR MOST RECENT DIASTOLIC BLOOD PRESSURE < 80 MM HG: ICD-10-PCS | Mod: ,,, | Performed by: OBSTETRICS & GYNECOLOGY

## 2023-09-13 PROCEDURE — 36415 COLL VENOUS BLD VENIPUNCTURE: CPT | Mod: PBBFAC | Performed by: OBSTETRICS & GYNECOLOGY

## 2023-09-13 PROCEDURE — 85025 COMPLETE CBC W/AUTO DIFF WBC: CPT | Mod: ,,, | Performed by: CLINICAL MEDICAL LABORATORY

## 2023-09-13 PROCEDURE — 3074F SYST BP LT 130 MM HG: CPT | Mod: ,,, | Performed by: OBSTETRICS & GYNECOLOGY

## 2023-09-13 PROCEDURE — 76856 US EXAM PELVIC COMPLETE: CPT | Mod: 26,,, | Performed by: RADIOLOGY

## 2023-09-13 PROCEDURE — 80053 COMPREHEN METABOLIC PANEL: CPT | Mod: ,,, | Performed by: CLINICAL MEDICAL LABORATORY

## 2023-09-13 PROCEDURE — 99212 OFFICE O/P EST SF 10 MIN: CPT | Mod: PBBFAC,25 | Performed by: OBSTETRICS & GYNECOLOGY

## 2023-09-13 PROCEDURE — 99214 PR OFFICE/OUTPT VISIT, EST, LEVL IV, 30-39 MIN: ICD-10-PCS | Mod: S$PBB,,, | Performed by: OBSTETRICS & GYNECOLOGY

## 2023-09-13 PROCEDURE — 99999PBSHW PR PBB SHADOW TECHNICAL ONLY FILED TO HB: ICD-10-PCS | Mod: PBBFAC,,,

## 2023-09-13 PROCEDURE — 76856 US EXAM PELVIC COMPLETE: CPT | Mod: TC

## 2023-09-13 PROCEDURE — 85025 CBC WITH DIFFERENTIAL: ICD-10-PCS | Mod: ,,, | Performed by: CLINICAL MEDICAL LABORATORY

## 2023-09-13 PROCEDURE — 84703 CHORIONIC GONADOTROPIN ASSAY: CPT | Mod: ,,, | Performed by: CLINICAL MEDICAL LABORATORY

## 2023-09-13 PROCEDURE — 11000001 HC ACUTE MED/SURG PRIVATE ROOM

## 2023-09-13 PROCEDURE — 80053 COMPREHENSIVE METABOLIC PANEL: ICD-10-PCS | Mod: ,,, | Performed by: CLINICAL MEDICAL LABORATORY

## 2023-09-13 PROCEDURE — 76856 US PELVIS COMPLETE NON OB: ICD-10-PCS | Mod: 26,,, | Performed by: RADIOLOGY

## 2023-09-13 PROCEDURE — 99214 OFFICE O/P EST MOD 30 MIN: CPT | Mod: S$PBB,,, | Performed by: OBSTETRICS & GYNECOLOGY

## 2023-09-13 PROCEDURE — 25000003 PHARM REV CODE 250: Performed by: OBSTETRICS & GYNECOLOGY

## 2023-09-13 PROCEDURE — 63600175 PHARM REV CODE 636 W HCPCS: Performed by: OBSTETRICS & GYNECOLOGY

## 2023-09-13 PROCEDURE — 3078F DIAST BP <80 MM HG: CPT | Mod: ,,, | Performed by: OBSTETRICS & GYNECOLOGY

## 2023-09-13 PROCEDURE — 3074F PR MOST RECENT SYSTOLIC BLOOD PRESSURE < 130 MM HG: ICD-10-PCS | Mod: ,,, | Performed by: OBSTETRICS & GYNECOLOGY

## 2023-09-13 PROCEDURE — 84703 HCG, SERUM, QUALITATIVE: ICD-10-PCS | Mod: ,,, | Performed by: CLINICAL MEDICAL LABORATORY

## 2023-09-13 PROCEDURE — 99999PBSHW PR PBB SHADOW TECHNICAL ONLY FILED TO HB: Mod: PBBFAC,,,

## 2023-09-13 RX ORDER — KETOROLAC TROMETHAMINE 30 MG/ML
30 INJECTION, SOLUTION INTRAMUSCULAR; INTRAVENOUS EVERY 8 HOURS
Status: CANCELLED | OUTPATIENT
Start: 2023-09-13 | End: 2023-09-14

## 2023-09-13 RX ORDER — NAPROXEN 250 MG/1
500 TABLET ORAL 2 TIMES DAILY WITH MEALS
Status: DISCONTINUED | OUTPATIENT
Start: 2023-09-13 | End: 2023-09-14 | Stop reason: HOSPADM

## 2023-09-13 RX ORDER — KETOROLAC TROMETHAMINE 30 MG/ML
30 INJECTION, SOLUTION INTRAMUSCULAR; INTRAVENOUS EVERY 8 HOURS
Status: DISCONTINUED | OUTPATIENT
Start: 2023-09-13 | End: 2023-09-13

## 2023-09-13 RX ORDER — SODIUM CHLORIDE, SODIUM LACTATE, POTASSIUM CHLORIDE, CALCIUM CHLORIDE 600; 310; 30; 20 MG/100ML; MG/100ML; MG/100ML; MG/100ML
INJECTION, SOLUTION INTRAVENOUS CONTINUOUS
Status: CANCELLED | OUTPATIENT
Start: 2023-09-13

## 2023-09-13 RX ORDER — BISACODYL 10 MG
10 SUPPOSITORY, RECTAL RECTAL DAILY PRN
Status: DISCONTINUED | OUTPATIENT
Start: 2023-09-13 | End: 2023-09-14 | Stop reason: HOSPADM

## 2023-09-13 RX ORDER — FAMOTIDINE 20 MG/1
20 TABLET, FILM COATED ORAL 2 TIMES DAILY
Status: CANCELLED | OUTPATIENT
Start: 2023-09-13

## 2023-09-13 RX ORDER — MORPHINE SULFATE 10 MG/ML
4 INJECTION, SOLUTION INTRAMUSCULAR; INTRAVENOUS
Status: CANCELLED | OUTPATIENT
Start: 2023-09-13

## 2023-09-13 RX ORDER — SODIUM CHLORIDE, SODIUM LACTATE, POTASSIUM CHLORIDE, CALCIUM CHLORIDE 600; 310; 30; 20 MG/100ML; MG/100ML; MG/100ML; MG/100ML
INJECTION, SOLUTION INTRAVENOUS CONTINUOUS
Status: DISCONTINUED | OUTPATIENT
Start: 2023-09-13 | End: 2023-09-14 | Stop reason: HOSPADM

## 2023-09-13 RX ORDER — TRAMADOL HYDROCHLORIDE 50 MG/1
50 TABLET ORAL EVERY 4 HOURS PRN
Status: DISCONTINUED | OUTPATIENT
Start: 2023-09-13 | End: 2023-09-14 | Stop reason: HOSPADM

## 2023-09-13 RX ORDER — TRAMADOL HYDROCHLORIDE 50 MG/1
50 TABLET ORAL EVERY 4 HOURS PRN
Status: CANCELLED | OUTPATIENT
Start: 2023-09-13

## 2023-09-13 RX ORDER — BISACODYL 10 MG
10 SUPPOSITORY, RECTAL RECTAL DAILY PRN
Status: CANCELLED | OUTPATIENT
Start: 2023-09-13

## 2023-09-13 RX ORDER — ONDANSETRON 2 MG/ML
4 INJECTION INTRAMUSCULAR; INTRAVENOUS EVERY 6 HOURS PRN
Status: DISCONTINUED | OUTPATIENT
Start: 2023-09-13 | End: 2023-09-14 | Stop reason: HOSPADM

## 2023-09-13 RX ORDER — FAMOTIDINE 20 MG/1
20 TABLET, FILM COATED ORAL 2 TIMES DAILY
Status: DISCONTINUED | OUTPATIENT
Start: 2023-09-13 | End: 2023-09-14 | Stop reason: HOSPADM

## 2023-09-13 RX ORDER — MORPHINE SULFATE 4 MG/ML
4 INJECTION, SOLUTION INTRAMUSCULAR; INTRAVENOUS
Status: DISCONTINUED | OUTPATIENT
Start: 2023-09-13 | End: 2023-09-14 | Stop reason: HOSPADM

## 2023-09-13 RX ORDER — ONDANSETRON 2 MG/ML
4 INJECTION INTRAMUSCULAR; INTRAVENOUS EVERY 6 HOURS PRN
Status: CANCELLED | OUTPATIENT
Start: 2023-09-13

## 2023-09-13 RX ADMIN — MORPHINE SULFATE 4 MG: 4 INJECTION, SOLUTION INTRAMUSCULAR; INTRAVENOUS at 12:09

## 2023-09-13 RX ADMIN — KETOROLAC TROMETHAMINE 30 MG: 30 INJECTION, SOLUTION INTRAMUSCULAR; INTRAVENOUS at 01:09

## 2023-09-13 RX ADMIN — TRAMADOL HYDROCHLORIDE 50 MG: 50 TABLET, COATED ORAL at 08:09

## 2023-09-13 RX ADMIN — FAMOTIDINE 20 MG: 20 TABLET ORAL at 12:09

## 2023-09-13 RX ADMIN — SODIUM CHLORIDE, SODIUM LACTATE, POTASSIUM CHLORIDE, AND CALCIUM CHLORIDE: 600; 310; 30; 20 INJECTION, SOLUTION INTRAVENOUS at 01:09

## 2023-09-13 RX ADMIN — FAMOTIDINE 20 MG: 20 TABLET ORAL at 08:09

## 2023-09-13 RX ADMIN — NAPROXEN 500 MG: 250 TABLET ORAL at 05:09

## 2023-09-13 NOTE — H&P
[facility] - Obstetrics And Gynecology  Obstetrics & Gynecology  History & Physical    Patient Name: Genoveva Ortiz  MRN: [id number]  Admission Date: (Not on file)  Primary Care Provider: Michele Loera MD    Subjective:     Chief Complaint/Reason for Admission:  Admitted secondary to severe lower pelvic pain over the last 3 days.    History of Present Illness:  Patient presented to my office.  She complains of severe cramping lower pelvic pain starting 3 days ago.  No history of chills or fever.  No nausea vomiting until the onset of nausea this morning followed by Cipro episodes of vomiting.    She is had a previous tubal ligation and endometrial ablation.    Current Outpatient Medications on File Prior to Visit   Medication Sig    aspirin 81 MG Chew Take 81 mg by mouth once daily.    latanoprost 0.005 % ophthalmic solution Place 1 drop into both eyes every evening.    predniSONE (DELTASONE) 20 MG tablet Take 1 tablet (20 mg total) by mouth once daily.     Current Facility-Administered Medications on File Prior to Visit   Medication    LIDOcaine-EPINEPHrine 1%-1:100,000 30 mL, LIDOcaine HCL 10 mg/ml (1%) 20 mL, sodium bicarbonate 10 mL in sodium chloride 0.9% 500 mL solution       Review of patient's allergies indicates:  No Known Allergies    Past Medical History:   Diagnosis Date    May-Thurner syndrome      OB History    Para Term  AB Living   4 3           SAB IAB Ectopic Multiple Live Births                  # Outcome Date GA Lbr Lane/2nd Weight Sex Delivery Anes PTL Lv   4             3 Para            2 Para            1 Para              Past Surgical History:   Procedure Laterality Date    ABLATION      AUGMENTATION OF BREAST      DILATION AND CURETTAGE OF UTERUS      Knee scope          RADIOFREQUENCY ABLATION Left 2023    Procedure: Left GSV RF Ablation;  Surgeon: Bakari Galicia DO;  Location: [facility] OR;  Service: Peripheral Vascular;   Laterality: Left;    SALPINGECTOMY Bilateral 12/2016    Stent placement femoral vein Dr. Novak  07/16/2020    TONSILLECTOMY  1989    TUBAL LIGATION      VAGINAL DELIVERY      x 3    VASCULAR SURGERY Left 10/21/2021    Stent Balloon Dr. Lindo @ [facility]     Family History       Problem Relation (Age of Onset)    Diabetes Paternal Grandmother    Heart disease Maternal Grandfather    Stroke Maternal Grandmother, Maternal Grandfather          Tobacco Use    Smoking status: Never    Smokeless tobacco: Never   Substance and Sexual Activity    Alcohol use: Never    Drug use: Never    Sexual activity: Yes     Review of Systems  Objective:     Vital Signs (Most Recent):  Temp: 97.9 °F (36.6 °C) (09/13/23 0949)  BP: 120/68 (09/13/23 0949) Vital Signs (24h Range):  [unfilled]        There is no height or weight on file to calculate BMI.  No LMP recorded. Patient has had an ablation.    Physical Exam:   Constitutional: She is oriented to person, place, and time. She appears well-developed and well-nourished.     Eyes: Pupils are equal, round, and reactive to light. EOM are normal.     Cardiovascular:  Normal rate, regular rhythm and normal heart sounds.             Pulmonary/Chest: Effort normal and breath sounds normal.        Abdominal: Soft. Bowel sounds are normal.     Genitourinary:    Genitourinary Comments: External normal vault normal cervix normal to appearance bimanual exam revealed uterus upper limits of normal size irregular contour.  Definite nodularity noted on the right side of the uterus consistent with small fibroids definite tenderness noted.  However no enlargement of ovaries were noted.  There was no obvious adnexal tenderness other than palpation of uterus caused significant pain.      Rectovaginal exam was unremarkable.             Musculoskeletal: Normal range of motion.       Neurological: She is alert and oriented to person, place, and time.    Skin: Skin is warm and dry.    Psychiatric: She has  a normal mood and affect. Her behavior is normal. Judgment and thought content normal.     Laboratory:    No visits with results within 7 Day(s) from this visit.   Latest known visit with results is:   Office Visit on 08/09/2022   Component Date Value Ref Range Status    PAP Recommendation External 12/18/2019 Pap in 12 months   Final    THIN PREP 12/18/2019 ascus   Final    HPV 16/18 12/18/2019 neg   Final    HPV Other 12/18/2019 Negative   Final    Case Report 08/09/2022    Final                    Value:Pap Cytology                                      Case: [id number]                                   Authorizing Provider:  Ac Flaherty MD      Collected:           08/09/2022 10:56 AM          Ordering Location:     [facility] Received:            08/11/2022 08:46 AM                                 - Obstetrics And                                                                                    Gynecology                                                                   First Screen:          ALEXA Oscar(ASCP)                                                    Specimen:    Liquid-Based Pap Test, Screening, Cervix                                                   Interpretation 08/09/2022 Negative              Final    General Categorization 08/09/2022 Negative for intraepithelial lesion or malignancy   Final    Specimen Adequacy 08/09/2022 Satisfactory for evaluation   Final    Clinical Information 08/09/2022    Final                    Value:This result contains rich text formatting which cannot be displayed here.    Disclaimer 08/09/2022    Final                    Value:This result contains rich text formatting which cannot be displayed here.    HPV 16 08/09/2022 Negative  Negative (NEG) Final    HPV 18 08/09/2022 Negative  Negative, Invalid Final    HPV Other 08/09/2022 Negative  Negative, Invalid Final        Diagnostic Results:            Assessment/Plan:     Discussed with  patient exam reveals abdomen soft bowel sounds present.  However significant tenderness to palpation of the uterus.    No obvious adnexal masses or tenderness noted.      Ultrasound done in the office today revealed uterus enlarged with multiple small uterine leiomyoma.      CBC and hCG are pending chemistry profile pending.      At this time I suspect pain is secondary to degenerating uterine fibroids.  Nevertheless she is in extreme pain and will be admitted for pain control we will discuss the possibility of proceeding with hysterectomy in the near future.  Will follow clinical course.  Follow-up pending lab.          Ac Flaherty MD  Obstetrics & Gynecology  [facility] - Obstetrics And Gynecology

## 2023-09-13 NOTE — TELEPHONE ENCOUNTER
Verified ; Patient stated she was in pain and she was at work on [address] pain on a scale of 0-10 is a 9, asked patient if she could come now and she stated yes is on her way.    ----- Message from Nikki Barbour sent at 2023  8:32 AM CDT -----  Regarding: APPOINTMENT TODAY  PATIENT IS WANTING TO BE SEEN TODAY FOR A CYST ON HER OVARY.  CALL BACK NUMBER IS [phone].

## 2023-09-13 NOTE — H&P (VIEW-ONLY)
[facility] - Obstetrics And Gynecology  Obstetrics & Gynecology  History & Physical    Patient Name: Genoveva Ortiz  MRN: [id number]  Admission Date: (Not on file)  Primary Care Provider: Michele Loera MD    Subjective:     Chief Complaint/Reason for Admission:  Admitted secondary to severe lower pelvic pain over the last 3 days.    History of Present Illness:  Patient presented to my office.  She complains of severe cramping lower pelvic pain starting 3 days ago.  No history of chills or fever.  No nausea vomiting until the onset of nausea this morning followed by Cipro episodes of vomiting.    She is had a previous tubal ligation and endometrial ablation.    Current Outpatient Medications on File Prior to Visit   Medication Sig    aspirin 81 MG Chew Take 81 mg by mouth once daily.    latanoprost 0.005 % ophthalmic solution Place 1 drop into both eyes every evening.    predniSONE (DELTASONE) 20 MG tablet Take 1 tablet (20 mg total) by mouth once daily.     Current Facility-Administered Medications on File Prior to Visit   Medication    LIDOcaine-EPINEPHrine 1%-1:100,000 30 mL, LIDOcaine HCL 10 mg/ml (1%) 20 mL, sodium bicarbonate 10 mL in sodium chloride 0.9% 500 mL solution       Review of patient's allergies indicates:  No Known Allergies    Past Medical History:   Diagnosis Date    May-Thurner syndrome      OB History    Para Term  AB Living   4 3           SAB IAB Ectopic Multiple Live Births                  # Outcome Date GA Lbr Lane/2nd Weight Sex Delivery Anes PTL Lv   4             3 Para            2 Para            1 Para              Past Surgical History:   Procedure Laterality Date    ABLATION      AUGMENTATION OF BREAST      DILATION AND CURETTAGE OF UTERUS      Knee scope          RADIOFREQUENCY ABLATION Left 2023    Procedure: Left GSV RF Ablation;  Surgeon: Bakari Galicia DO;  Location: [facility] OR;  Service: Peripheral Vascular;   Laterality: Left;    SALPINGECTOMY Bilateral 12/2016    Stent placement femoral vein Dr. Novak  07/16/2020    TONSILLECTOMY  1989    TUBAL LIGATION      VAGINAL DELIVERY      x 3    VASCULAR SURGERY Left 10/21/2021    Stent Balloon Dr. Lindo @ [facility]     Family History       Problem Relation (Age of Onset)    Diabetes Paternal Grandmother    Heart disease Maternal Grandfather    Stroke Maternal Grandmother, Maternal Grandfather          Tobacco Use    Smoking status: Never    Smokeless tobacco: Never   Substance and Sexual Activity    Alcohol use: Never    Drug use: Never    Sexual activity: Yes     Review of Systems  Objective:     Vital Signs (Most Recent):  Temp: 97.9 °F (36.6 °C) (09/13/23 0949)  BP: 120/68 (09/13/23 0949) Vital Signs (24h Range):  [unfilled]        There is no height or weight on file to calculate BMI.  No LMP recorded. Patient has had an ablation.    Physical Exam:   Constitutional: She is oriented to person, place, and time. She appears well-developed and well-nourished.     Eyes: Pupils are equal, round, and reactive to light. EOM are normal.     Cardiovascular:  Normal rate, regular rhythm and normal heart sounds.             Pulmonary/Chest: Effort normal and breath sounds normal.        Abdominal: Soft. Bowel sounds are normal.     Genitourinary:    Genitourinary Comments: External normal vault normal cervix normal to appearance bimanual exam revealed uterus upper limits of normal size irregular contour.  Definite nodularity noted on the right side of the uterus consistent with small fibroids definite tenderness noted.  However no enlargement of ovaries were noted.  There was no obvious adnexal tenderness other than palpation of uterus caused significant pain.      Rectovaginal exam was unremarkable.             Musculoskeletal: Normal range of motion.       Neurological: She is alert and oriented to person, place, and time.    Skin: Skin is warm and dry.    Psychiatric: She has  a normal mood and affect. Her behavior is normal. Judgment and thought content normal.     Laboratory:    No visits with results within 7 Day(s) from this visit.   Latest known visit with results is:   Office Visit on 08/09/2022   Component Date Value Ref Range Status    PAP Recommendation External 12/18/2019 Pap in 12 months   Final    THIN PREP 12/18/2019 ascus   Final    HPV 16/18 12/18/2019 neg   Final    HPV Other 12/18/2019 Negative   Final    Case Report 08/09/2022    Final                    Value:Pap Cytology                                      Case: [id number]                                   Authorizing Provider:  Ac Flaherty MD      Collected:           08/09/2022 10:56 AM          Ordering Location:     [facility] Received:            08/11/2022 08:46 AM                                 - Obstetrics And                                                                                    Gynecology                                                                   First Screen:          ALEXA Oscar(ASCP)                                                    Specimen:    Liquid-Based Pap Test, Screening, Cervix                                                   Interpretation 08/09/2022 Negative              Final    General Categorization 08/09/2022 Negative for intraepithelial lesion or malignancy   Final    Specimen Adequacy 08/09/2022 Satisfactory for evaluation   Final    Clinical Information 08/09/2022    Final                    Value:This result contains rich text formatting which cannot be displayed here.    Disclaimer 08/09/2022    Final                    Value:This result contains rich text formatting which cannot be displayed here.    HPV 16 08/09/2022 Negative  Negative (NEG) Final    HPV 18 08/09/2022 Negative  Negative, Invalid Final    HPV Other 08/09/2022 Negative  Negative, Invalid Final        Diagnostic Results:            Assessment/Plan:     Discussed with  patient exam reveals abdomen soft bowel sounds present.  However significant tenderness to palpation of the uterus.    No obvious adnexal masses or tenderness noted.      Ultrasound done in the office today revealed uterus enlarged with multiple small uterine leiomyoma.      CBC and hCG are pending chemistry profile pending.      At this time I suspect pain is secondary to degenerating uterine fibroids.  Nevertheless she is in extreme pain and will be admitted for pain control we will discuss the possibility of proceeding with hysterectomy in the near future.  Will follow clinical course.  Follow-up pending lab.          Ac Flaherty MD  Obstetrics & Gynecology  [facility] - Obstetrics [facility] Gynecology

## 2023-09-14 VITALS
BODY MASS INDEX: 27.97 KG/M2 | DIASTOLIC BLOOD PRESSURE: 87 MMHG | OXYGEN SATURATION: 100 % | HEIGHT: 65 IN | RESPIRATION RATE: 18 BRPM | WEIGHT: 167.88 LBS | TEMPERATURE: 98 F | HEART RATE: 80 BPM | SYSTOLIC BLOOD PRESSURE: 132 MMHG

## 2023-09-14 DIAGNOSIS — D25.1 INTRAMURAL LEIOMYOMA OF UTERUS: Primary | ICD-10-CM

## 2023-09-14 PROBLEM — R10.2 PELVIC PAIN: Status: ACTIVE | Noted: 2023-09-14

## 2023-09-14 PROCEDURE — 25000003 PHARM REV CODE 250: Performed by: OBSTETRICS & GYNECOLOGY

## 2023-09-14 PROCEDURE — G0378 HOSPITAL OBSERVATION PER HR: HCPCS

## 2023-09-14 RX ORDER — NAPROXEN SODIUM 550 MG/1
550 TABLET ORAL 2 TIMES DAILY WITH MEALS
Qty: 20 TABLET | Refills: 0 | Status: ON HOLD | OUTPATIENT
Start: 2023-09-14 | End: 2023-12-18

## 2023-09-14 RX ADMIN — FAMOTIDINE 20 MG: 20 TABLET ORAL at 09:09

## 2023-09-14 RX ADMIN — NAPROXEN 500 MG: 250 TABLET ORAL at 09:09

## 2023-09-14 NOTE — PLAN OF CARE
Problem: Adult Inpatient Plan of Care  Goal: Plan of Care Review  Flowsheets (Taken 9/14/2023 0545)  Plan of Care Reviewed With: patient  Goal: Patient-Specific Goal (Individualized)  Flowsheets (Taken 9/14/2023 0545)  Anxieties, Fears or Concerns: Pain  Individualized Care Needs: Pain

## 2023-09-14 NOTE — DISCHARGE INSTRUCTIONS
Hospitalist Discharge orders  *Notify your healthcare provider if you experience any of the following: temperature >100.4  * Notify your healthcare provider if you experience any of the following: redness, tenderness.  *Notify your healthcare provider if you experience any of the following: difficulty breathing or     increased cough.  *Notify your physician if you experience any persistent nausea, vomiting, or diarrhea or headache  *Notify your physician if you experience any of the following:severe uncontrolled pain;worsening rash, increased confusion or weakness; dizziness, lightheadedness or visual disturbances          Follow-up appt with Dr. Flaherty on Wednesday, September 20, 2023 at 11:30 a.m.

## 2023-09-14 NOTE — DISCHARGE SUMMARY
Patient admitted yesterday with severe pelvic pain.  Follow-up lab ultrasound and examination revealed no other abnormalities other than enlarged uterus secondary to uterine leiomyoma.      HCG negative CBC revealed no evidence of white count.  She was afebrile.    I did not feel that antibiotics were needed.    She was admitted for symptomatic relief of pain medications.  She was given initially IV morphine.  Later this was changed to Ultram.      In the evening hours she was much more comfortable.      She now states she had good night.  No significant pain at present.  Mild cramps.      Repeat pelvic examination revealed abdomen soft uterus slightly enlarged approximally 9 week size irregular contour consistent with uterine leiomyoma.  However no extreme pain as noted on exam yesterday.      We discussed discharge home today.  She will continue Anaprox DS b.i.d. and substitute with extra-strength Tylenol if needed.    We have discussed scheduling hysterectomy in the future.      She will have follow-up with me in 1 week to discuss further management.

## 2023-09-20 ENCOUNTER — OFFICE VISIT (OUTPATIENT)
Dept: OBSTETRICS AND GYNECOLOGY | Facility: CLINIC | Age: 43
End: 2023-09-20
Payer: COMMERCIAL

## 2023-09-20 VITALS — SYSTOLIC BLOOD PRESSURE: 128 MMHG | DIASTOLIC BLOOD PRESSURE: 72 MMHG

## 2023-09-20 DIAGNOSIS — R10.2 PELVIC PAIN: Primary | ICD-10-CM

## 2023-09-20 PROCEDURE — 3074F PR MOST RECENT SYSTOLIC BLOOD PRESSURE < 130 MM HG: ICD-10-PCS | Mod: ,,, | Performed by: OBSTETRICS & GYNECOLOGY

## 2023-09-20 PROCEDURE — 3074F SYST BP LT 130 MM HG: CPT | Mod: ,,, | Performed by: OBSTETRICS & GYNECOLOGY

## 2023-09-20 PROCEDURE — 3078F PR MOST RECENT DIASTOLIC BLOOD PRESSURE < 80 MM HG: ICD-10-PCS | Mod: ,,, | Performed by: OBSTETRICS & GYNECOLOGY

## 2023-09-20 PROCEDURE — 99212 PR OFFICE/OUTPT VISIT, EST, LEVL II, 10-19 MIN: ICD-10-PCS | Mod: S$PBB,,, | Performed by: OBSTETRICS & GYNECOLOGY

## 2023-09-20 PROCEDURE — 99212 OFFICE O/P EST SF 10 MIN: CPT | Mod: S$PBB,,, | Performed by: OBSTETRICS & GYNECOLOGY

## 2023-09-20 PROCEDURE — 3078F DIAST BP <80 MM HG: CPT | Mod: ,,, | Performed by: OBSTETRICS & GYNECOLOGY

## 2023-09-20 PROCEDURE — 99213 OFFICE O/P EST LOW 20 MIN: CPT | Mod: PBBFAC | Performed by: OBSTETRICS & GYNECOLOGY

## 2023-09-22 NOTE — INTERVAL H&P NOTE
The patient has been examined and the H&P has been reviewed:    I concur with the findings and no changes have occurred since H&P was written.        Active Hospital Problems    Diagnosis  POA    *Pelvic pain [R10.2]  Yes      Resolved Hospital Problems   No resolved problems to display.

## 2023-09-26 ENCOUNTER — TELEPHONE (OUTPATIENT)
Dept: OBSTETRICS AND GYNECOLOGY | Facility: CLINIC | Age: 43
End: 2023-09-26
Payer: COMMERCIAL

## 2023-09-26 NOTE — TELEPHONE ENCOUNTER
Verified ; Patient just wanted to know why surgery stated removal of ovaries. Explained to patient we have to schedule that way just in case he need to remove one or both once he has looked at them and the surgical department will have what he needs just in case. And if ovaries are in good condition he will not remove. Patient completely understood why after I explained it to her.     ----- Message from Poonam Wade sent at 2023  1:22 PM CDT -----   Please call. Needs to discuss upcoming procedure. Call back @ [phone]

## 2023-11-28 DIAGNOSIS — Z01.818 PRE-OP EXAM: Primary | ICD-10-CM

## 2023-12-05 ENCOUNTER — OFFICE VISIT (OUTPATIENT)
Dept: OBSTETRICS AND GYNECOLOGY | Facility: CLINIC | Age: 43
End: 2023-12-05
Payer: COMMERCIAL

## 2023-12-05 ENCOUNTER — HOSPITAL ENCOUNTER (OUTPATIENT)
Dept: RADIOLOGY | Facility: HOSPITAL | Age: 43
Discharge: HOME | End: 2023-12-05
Attending: OBSTETRICS & GYNECOLOGY
Payer: COMMERCIAL

## 2023-12-05 ENCOUNTER — CLINICAL SUPPORT (OUTPATIENT)
Dept: CARDIOLOGY | Facility: CLINIC | Age: 43
End: 2023-12-05
Payer: COMMERCIAL

## 2023-12-05 VITALS
BODY MASS INDEX: 29.05 KG/M2 | TEMPERATURE: 98 F | HEIGHT: 65 IN | WEIGHT: 174.38 LBS | SYSTOLIC BLOOD PRESSURE: 118 MMHG | DIASTOLIC BLOOD PRESSURE: 60 MMHG

## 2023-12-05 DIAGNOSIS — Z01.818 PRE-OP EXAM: ICD-10-CM

## 2023-12-05 DIAGNOSIS — E87.6 HYPOKALEMIA: Primary | ICD-10-CM

## 2023-12-05 PROCEDURE — 99024 POSTOP FOLLOW-UP VISIT: CPT | Mod: ,,, | Performed by: OBSTETRICS & GYNECOLOGY

## 2023-12-05 PROCEDURE — 99211 OFF/OP EST MAY X REQ PHY/QHP: CPT | Mod: PBBFAC,25

## 2023-12-05 PROCEDURE — 71046 XR CHEST PA AND LATERAL: ICD-10-PCS | Mod: 26,,, | Performed by: RADIOLOGY

## 2023-12-05 PROCEDURE — 93010 ELECTROCARDIOGRAM REPORT: CPT | Mod: S$PBB,,, | Performed by: STUDENT IN AN ORGANIZED HEALTH CARE EDUCATION/TRAINING PROGRAM

## 2023-12-05 PROCEDURE — 99024 PR POST-OP FOLLOW-UP VISIT: ICD-10-PCS | Mod: ,,, | Performed by: OBSTETRICS & GYNECOLOGY

## 2023-12-05 PROCEDURE — 99214 OFFICE O/P EST MOD 30 MIN: CPT | Mod: PBBFAC,25 | Performed by: OBSTETRICS & GYNECOLOGY

## 2023-12-05 PROCEDURE — 93010 EKG 12-LEAD: ICD-10-PCS | Mod: S$PBB,,, | Performed by: STUDENT IN AN ORGANIZED HEALTH CARE EDUCATION/TRAINING PROGRAM

## 2023-12-05 PROCEDURE — 93005 ELECTROCARDIOGRAM TRACING: CPT | Mod: PBBFAC | Performed by: STUDENT IN AN ORGANIZED HEALTH CARE EDUCATION/TRAINING PROGRAM

## 2023-12-05 PROCEDURE — 71046 X-RAY EXAM CHEST 2 VIEWS: CPT | Mod: TC

## 2023-12-05 PROCEDURE — 71046 X-RAY EXAM CHEST 2 VIEWS: CPT | Mod: 26,,, | Performed by: RADIOLOGY

## 2023-12-05 NOTE — H&P (VIEW-ONLY)
[facility] - Obstetrics And Gynecology  Obstetrics & Gynecology  History & Physical    Patient Name: Genoveva Ortiz  MRN: [id number]  Admission Date: (Not on file)  Primary Care Provider: Michele Loera MD    Subjective:     Chief Complaint/Reason for Admission:  Admitted for robotically assisted hysterectomy with possible BSO.    History of Present Illness:       Progress Notes    Ac Flaherty MD at 9/20/2023 11:15 AM    Status: Signed   Expand All Collapse All     Subjective:         Subjective   Patient ID: Genoveva Ortiz is a 43 y.o. female.     Chief Complaint: Follow-up (1week f/u after hospital stay-doing better. )     Presents for follow-up after hospitalization last week.       She presented to my office in extreme doubling over pain with nausea and vomiting.  This was ultimately found to be uterine leiomyoma.  She has had a previous endometrial ablation therefore she does not have further menses.       She was admitted and given IV pain medications.  Symptoms markedly cleared over the next 24 hours.       She was discharged home and presents today for follow-up.     No significant pain within the last week.     Follow-up        Review of Systems        Objective:      Objective   Physical Exam  Genitourinary:     Comments: On repeat examination today no bleeding was noted bimanual exam again revealed uterus upper limits normal in size irregular contour with a definite 3 cm fibroid felt along the right lateral aspect of the uterine body.  However no other adnexal masses were noted.           Assessment:      Assessment   1. Pelvic pain          Plan:      Plan   Patient Instructions   We have discussed in the office the possibility of later proceeding with hysterectomy.       However it is unsure whether she will have any more significant pain with uterine fibroids.  I have discussed the probability of degeneration of the midportion of a uterine fibroid  causing the acute onset of pain which is now resolved.       We have tentatively scheduled her for hysterectomy in late November early 2023.  However if no further pain occurs we may elect to simply cancel that surgery.            Preop examination 2023.      Since last visit she has continued to have menstrual cramping pain.    She desires proceeding with hysterectomy as previously discussed.      We have decided to leave ovaries intact unless clinically indicated for removal at the time of surgery.      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      Has frequent swelling of left leg but no history of DVT.  Nevertheless I have discussed giving preoperative heparin 5000 units subQ             Current Outpatient Medications on File Prior to Visit   Medication Sig    aspirin 81 MG Chew Take 81 mg by mouth once daily.    latanoprost 0.005 % ophthalmic solution Place 1 drop into both eyes every evening.    naproxen sodium (ANAPROX) 550 MG tablet Take 1 tablet (550 mg total) by mouth 2 (two) times daily with meals.         Review of patient's allergies indicates:  No Known Allergies    Past Medical History:   Diagnosis Date    May-Thurner syndrome      OB History    Para Term  AB Living   4 3           SAB IAB Ectopic Multiple Live Births                  # Outcome Date GA Lbr Lane/2nd Weight Sex Delivery Anes PTL Lv   4             3 Para            2 Para            1 Para              Past Surgical History:   Procedure Laterality Date    ABLATION      AUGMENTATION OF BREAST      DILATION AND CURETTAGE OF UTERUS      Knee scope          RADIOFREQUENCY ABLATION Left 2023    Procedure: Left GSV RF Ablation;  Surgeon: Bakari Galicia DO;  Location: [facility];  Service: Peripheral Vascular;  Laterality: Left;    SALPINGECTOMY Bilateral 2016    Stent placement femoral vein Dr. Novak  2020    TONSILLECTOMY      TUBAL LIGATION       VAGINAL DELIVERY      x 3    VASCULAR SURGERY Left 10/21/2021    Stent Balloon Dr. Lindo @ [facility]     Family History       Problem Relation (Age of Onset)    Diabetes Paternal Grandmother    Heart disease Maternal Grandfather    Stroke Maternal Grandmother, Maternal Grandfather          Tobacco Use    Smoking status: Never    Smokeless tobacco: Never   Substance and Sexual Activity    Alcohol use: Never    Drug use: Never    Sexual activity: Yes     Review of Systems   Respiratory: Negative.     Cardiovascular: Negative.    Endocrine: Negative.    Neurological: Negative.    Hematological:         History of May Urias syndrome with stent in left iliac vein.   Objective:     Vital Signs (Most Recent):    Vital Signs (24h Range):  [unfilled]        There is no height or weight on file to calculate BMI.  No LMP recorded. Patient has had an ablation.    Physical Exam:   Constitutional: She is oriented to person, place, and time. She appears well-developed and well-nourished.    HENT:   Head: Normocephalic and atraumatic.    Eyes: Pupils are equal, round, and reactive to light.     Cardiovascular:  Normal rate, regular rhythm and normal heart sounds.             Pulmonary/Chest: Effort normal and breath sounds normal.   Breasts- No palpable masses no skin retraction or nipple dimpling post augmentation.        Abdominal: Soft. Bowel sounds are normal.     Genitourinary:    Vagina normal.      Genitourinary Comments: Bimanual exam revealed uterus irregular shapes slightly enlarged consistent with small uterine leiomyoma.  Rectovaginal unremarkable.    Pap and HPV testing August 20 22 neg             Musculoskeletal: Normal range of motion.       Neurological: She is alert and oriented to person, place, and time.     Psychiatric: She has a normal mood and affect. Her behavior is normal. Judgment and thought content normal.       Laboratory:    No visits with results within 7 Day(s) from this visit.   Latest  known visit with results is:   Office Visit on 09/13/2023   Component Date Value Ref Range Status    Pregnancy, Serum 09/13/2023 Negative  Negative, QNS Final    Sodium 09/13/2023 135 (L)  136 - 145 mmol/L Final    Potassium 09/13/2023 3.3 (L)  3.5 - 5.1 mmol/L Final    Chloride 09/13/2023 103  98 - 107 mmol/L Final    CO2 09/13/2023 28  21 - 32 mmol/L Final    Anion Gap 09/13/2023 7  7 - 16 mmol/L Final    Glucose 09/13/2023 138 (H)  74 - 106 mg/dL Final    BUN 09/13/2023 16  7 - 18 mg/dL Final    Creatinine 09/13/2023 1.16 (H)  0.55 - 1.02 mg/dL Final    BUN/Creatinine Ratio 09/13/2023 14  6 - 20 Final    Calcium 09/13/2023 8.8  8.5 - 10.1 mg/dL Final    Total Protein 09/13/2023 6.6  6.4 - 8.2 g/dL Final    Albumin 09/13/2023 3.8  3.5 - 5.0 g/dL Final    Globulin 09/13/2023 2.8  2.0 - 4.0 g/dL Final    A/G Ratio 09/13/2023 1.4   Final    Bilirubin, Total 09/13/2023 0.6  >0.0 - 1.2 mg/dL Final    Alk Phos 09/13/2023 52  37 - 98 U/L Final    ALT 09/13/2023 24  13 - 56 U/L Final    AST 09/13/2023 22  15 - 37 U/L Final    eGFR 09/13/2023 60  >=60 mL/min/1.73m2 Final    WBC 09/13/2023 6.65  4.50 - 11.00 K/uL Final    RBC 09/13/2023 4.06 (L)  4.20 - 5.40 M/uL Final    Hemoglobin 09/13/2023 12.8  12.0 - 16.0 g/dL Final    Hematocrit 09/13/2023 36.5 (L)  38.0 - 47.0 % Final    MCV 09/13/2023 89.9  80.0 - 96.0 fL Final    MCH 09/13/2023 31.5 (H)  27.0 - 31.0 pg Final    MCHC 09/13/2023 35.1  32.0 - 36.0 g/dL Final    RDW 09/13/2023 12.0  11.5 - 14.5 % Final    Platelet Count 09/13/2023 144 (L)  150 - 400 K/uL Final    MPV 09/13/2023 10.2  9.4 - 12.4 fL Final    Neutrophils % 09/13/2023 59.8  53.0 - 65.0 % Final    Lymphocytes % 09/13/2023 29.5  27.0 - 41.0 % Final    Monocytes % 09/13/2023 9.2 (H)  2.0 - 6.0 % Final    Eosinophils % 09/13/2023 0.9 (L)  1.0 - 4.0 % Final    Basophils % 09/13/2023 0.3  0.0 - 1.0 % Final    Immature Granulocytes % 09/13/2023 0.3  0.0 - 0.4 % Final    nRBC, Auto 09/13/2023 0.0  <=0.0 % Final     Neutrophils, Abs 09/13/2023 3.98  1.80 - 7.70 K/uL Final    Lymphocytes, Absolute 09/13/2023 1.96  1.00 - 4.80 K/uL Final    Monocytes, Absolute 09/13/2023 0.61  0.00 - 0.80 K/uL Final    Eosinophils, Absolute 09/13/2023 0.06  0.00 - 0.50 K/uL Final    Basophils, Absolute 09/13/2023 0.02  0.00 - 0.20 K/uL Final    Immature Granulocytes, Absolute 09/13/2023 0.02  0.00 - 0.04 K/uL Final    nRBC, Absolute 09/13/2023 0.00  <=0.00 x10e3/uL Final    Diff Type 09/13/2023 Auto   Final        Diagnostic Results:          Narrative  Performed by: GEMUSE  Test Reason : Z01.818,    Vent. Rate : 050 BPM     Atrial Rate : 050 BPM     P-R Int : 154 ms          QRS Dur : 082 ms      QT Int : 406 ms       P-R-T Axes : 052 074 060 degrees     QTc Int : 370 ms    Sinus bradycardia with Premature atrial complexes  Otherwise normal ECG  No previous ECGs available  Confirmed by Mercedez PEREZ, Stanley DEL ROSARIO ([id number]) on 12/6/2023 5:24:05 PM    Referred By: MILLER NICOLAS           Confirmed By:Stanley Newsome MD      Specimen Collected: 12/05/23 09:36 CST               Reading Physician Reading Date Result Priority   Rza Liu MD  [phone] 12/5/2023 Routine     Narrative & Impression  EXAMINATION:  XR CHEST PA AND LATERAL     CLINICAL HISTORY:  Encounter for other preprocedural examination     TECHNIQUE:  PA and lateral views of the chest were performed.     COMPARISON:  None     FINDINGS:  Heart size normal.  Lungs clear.  No pneumothorax or pleural effusion.     Impression:     No acute findings.        Electronically signed by: Raz Liu  Date:                                            12/05/2023  Time:                                           09:45           Exam Ended: 12/05/23 09:42 CST                      Exam Ended: 12/05/23 09:42 CST             US Pelvis Complete Non OB    Narrative  EXAMINATION:  US PELVIS COMPLETE NON OB    CLINICAL HISTORY:  .  Lower abdominal pain, unspecified    COMPARISON:  No previous  similar    TECHNIQUE:  Real-time ultrasound images are captured and archived.    FINDINGS:  There is mild free fluid in the pelvis.    Urinary bladder is moderately distended and generally unremarkable.    The anteverted uterus measures 106 x 75 x 65 mm.  There are more than 3 hypoechoic mural and subserosal masses compatible with uterine leiomyomata, the largest of which measure 3.8 cm, 3.3 cm, and 2.3 cm maximum diameters respectively.  Endometrial echo measures 12 mm.  There is a small amount of nonspecific fluid within the endometrial cavity.    Right ovary measures 31 x 24 x 23 mm; left measures 28 x 27 x 19 mm.  There is no adnexal mass.  There is gross color Doppler flow to either ovary.    Impression  Uterine leiomyomata    Small amount nonspecific fluid within the endometrial cavity    Mild free fluid in the pelvis which may be physiologic      Electronically signed by: Juan Carlos Noriega  Date:    09/13/2023  Time:    10:57          Assessment/Plan:     Discussed proceeding with robotically assisted hysterectomy.      We will remove ovaries only if clinically indicated at the time of surgery.      The risk of surgery including anesthetic risk bleeding infection blood clots bowel bladder or ureter injury discussed.      Will give heparin 5000 units preoperatively.      pyridium prescribed.          Ac Flaherty MD  Obstetrics & Gynecology  [facility] - [facility] Gyne[facility]

## 2023-12-05 NOTE — PROGRESS NOTES
[facility] - [facility]stetrics [facility] Gynecology  Obstetrics & Gynecology  History & Physical    Patient Name: Genoveva Ortiz  MRN: [id number]  Admission Date: (Not on file)  Primary Care Provider: Michele Loera MD    Subjective:     Chief Complaint/Reason for Admission:  Admitted for robotically assisted hysterectomy with possible BSO.    History of Present Illness:       Progress Notes    Ac Flaherty MD at 9/20/2023 11:15 AM    Status: Signed   Expand All Collapse All     Subjective:         Subjective   Patient ID: Genoveva Ortiz is a 43 y.o. female.     Chief Complaint: Follow-up (1week f/u after hospital stay-doing better. )     Presents for follow-up after hospitalization last week.       She presented to my office in extreme doubling over pain with nausea and vomiting.  This was ultimately found to be uterine leiomyoma.  She has had a previous endometrial ablation therefore she does not have further menses.       She was admitted and given IV pain medications.  Symptoms markedly cleared over the next 24 hours.       She was discharged home and presents today for follow-up.     No significant pain within the last week.     Follow-up        Review of Systems        Objective:      Objective   Physical Exam  Genitourinary:     Comments: On repeat examination today no bleeding was noted bimanual exam again revealed uterus upper limits normal in size irregular contour with a definite 3 cm fibroid felt along the right lateral aspect of the uterine body.  However no other adnexal masses were noted.           Assessment:      Assessment   1. Pelvic pain          Plan:      Plan   Patient Instructions   We have discussed in the office the possibility of later proceeding with hysterectomy.       However it is unsure whether she will have any more significant pain with uterine fibroids.  I have discussed the probability of degeneration of the midportion of a uterine fibroid  causing the acute onset of pain which is now resolved.       We have tentatively scheduled her for hysterectomy in late November early 2023.  However if no further pain occurs we may elect to simply cancel that surgery.            Preop examination 2023.      Since last visit she has continued to have menstrual cramping pain.    She desires proceeding with hysterectomy as previously discussed.      We have decided to leave ovaries intact unless clinically indicated for removal at the time of surgery.      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      Has frequent swelling of left leg but no history of DVT.  Nevertheless I have discussed giving preoperative heparin 5000 units subQ             Current Outpatient Medications on File Prior to Visit   Medication Sig    aspirin 81 MG Chew Take 81 mg by mouth once daily.    latanoprost 0.005 % ophthalmic solution Place 1 drop into both eyes every evening.    naproxen sodium (ANAPROX) 550 MG tablet Take 1 tablet (550 mg total) by mouth 2 (two) times daily with meals.         Review of patient's allergies indicates:  No Known Allergies    Past Medical History:   Diagnosis Date    May-Thurner syndrome      OB History    Para Term  AB Living   4 3           SAB IAB Ectopic Multiple Live Births                  # Outcome Date GA Lbr Lane/2nd Weight Sex Delivery Anes PTL Lv   4             3 Para            2 Para            1 Para              Past Surgical History:   Procedure Laterality Date    ABLATION      AUGMENTATION OF BREAST      DILATION AND CURETTAGE OF UTERUS      Knee scope          RADIOFREQUENCY ABLATION Left 2023    Procedure: Left GSV RF Ablation;  Surgeon: Bakari Galicia DO;  Location: [facility];  Service: Peripheral Vascular;  Laterality: Left;    SALPINGECTOMY Bilateral 2016    Stent placement femoral vein Dr. Novak  2020    TONSILLECTOMY      TUBAL LIGATION       VAGINAL DELIVERY      x 3    VASCULAR SURGERY Left 10/21/2021    Stent Balloon Dr. Lindo @ [facility]     Family History       Problem Relation (Age of Onset)    Diabetes Paternal Grandmother    Heart disease Maternal Grandfather    Stroke Maternal Grandmother, Maternal Grandfather          Tobacco Use    Smoking status: Never    Smokeless tobacco: Never   Substance and Sexual Activity    Alcohol use: Never    Drug use: Never    Sexual activity: Yes     Review of Systems   Respiratory: Negative.     Cardiovascular: Negative.    Endocrine: Negative.    Neurological: Negative.    Hematological:         History of May Urias syndrome with stent in left iliac vein.   Objective:     Vital Signs (Most Recent):    Vital Signs (24h Range):  [unfilled]        There is no height or weight on file to calculate BMI.  No LMP recorded. Patient has had an ablation.    Physical Exam:   Constitutional: She is oriented to person, place, and time. She appears well-developed and well-nourished.    HENT:   Head: Normocephalic and atraumatic.    Eyes: Pupils are equal, round, and reactive to light.     Cardiovascular:  Normal rate, regular rhythm and normal heart sounds.             Pulmonary/Chest: Effort normal and breath sounds normal.   Breasts- No palpable masses no skin retraction or nipple dimpling post augmentation.        Abdominal: Soft. Bowel sounds are normal.     Genitourinary:    Vagina normal.      Genitourinary Comments: Bimanual exam revealed uterus irregular shapes slightly enlarged consistent with small uterine leiomyoma.  Rectovaginal unremarkable.    Pap and HPV testing August 20 22 neg             Musculoskeletal: Normal range of motion.       Neurological: She is alert and oriented to person, place, and time.     Psychiatric: She has a normal mood and affect. Her behavior is normal. Judgment and thought content normal.       Laboratory:    No visits with results within 7 Day(s) from this visit.   Latest  known visit with results is:   Office Visit on 09/13/2023   Component Date Value Ref Range Status    Pregnancy, Serum 09/13/2023 Negative  Negative, QNS Final    Sodium 09/13/2023 135 (L)  136 - 145 mmol/L Final    Potassium 09/13/2023 3.3 (L)  3.5 - 5.1 mmol/L Final    Chloride 09/13/2023 103  98 - 107 mmol/L Final    CO2 09/13/2023 28  21 - 32 mmol/L Final    Anion Gap 09/13/2023 7  7 - 16 mmol/L Final    Glucose 09/13/2023 138 (H)  74 - 106 mg/dL Final    BUN 09/13/2023 16  7 - 18 mg/dL Final    Creatinine 09/13/2023 1.16 (H)  0.55 - 1.02 mg/dL Final    BUN/Creatinine Ratio 09/13/2023 14  6 - 20 Final    Calcium 09/13/2023 8.8  8.5 - 10.1 mg/dL Final    Total Protein 09/13/2023 6.6  6.4 - 8.2 g/dL Final    Albumin 09/13/2023 3.8  3.5 - 5.0 g/dL Final    Globulin 09/13/2023 2.8  2.0 - 4.0 g/dL Final    A/G Ratio 09/13/2023 1.4   Final    Bilirubin, Total 09/13/2023 0.6  >0.0 - 1.2 mg/dL Final    Alk Phos 09/13/2023 52  37 - 98 U/L Final    ALT 09/13/2023 24  13 - 56 U/L Final    AST 09/13/2023 22  15 - 37 U/L Final    eGFR 09/13/2023 60  >=60 mL/min/1.73m2 Final    WBC 09/13/2023 6.65  4.50 - 11.00 K/uL Final    RBC 09/13/2023 4.06 (L)  4.20 - 5.40 M/uL Final    Hemoglobin 09/13/2023 12.8  12.0 - 16.0 g/dL Final    Hematocrit 09/13/2023 36.5 (L)  38.0 - 47.0 % Final    MCV 09/13/2023 89.9  80.0 - 96.0 fL Final    MCH 09/13/2023 31.5 (H)  27.0 - 31.0 pg Final    MCHC 09/13/2023 35.1  32.0 - 36.0 g/dL Final    RDW 09/13/2023 12.0  11.5 - 14.5 % Final    Platelet Count 09/13/2023 144 (L)  150 - 400 K/uL Final    MPV 09/13/2023 10.2  9.4 - 12.4 fL Final    Neutrophils % 09/13/2023 59.8  53.0 - 65.0 % Final    Lymphocytes % 09/13/2023 29.5  27.0 - 41.0 % Final    Monocytes % 09/13/2023 9.2 (H)  2.0 - 6.0 % Final    Eosinophils % 09/13/2023 0.9 (L)  1.0 - 4.0 % Final    Basophils % 09/13/2023 0.3  0.0 - 1.0 % Final    Immature Granulocytes % 09/13/2023 0.3  0.0 - 0.4 % Final    nRBC, Auto 09/13/2023 0.0  <=0.0 % Final     Neutrophils, Abs 09/13/2023 3.98  1.80 - 7.70 K/uL Final    Lymphocytes, Absolute 09/13/2023 1.96  1.00 - 4.80 K/uL Final    Monocytes, Absolute 09/13/2023 0.61  0.00 - 0.80 K/uL Final    Eosinophils, Absolute 09/13/2023 0.06  0.00 - 0.50 K/uL Final    Basophils, Absolute 09/13/2023 0.02  0.00 - 0.20 K/uL Final    Immature Granulocytes, Absolute 09/13/2023 0.02  0.00 - 0.04 K/uL Final    nRBC, Absolute 09/13/2023 0.00  <=0.00 x10e3/uL Final    Diff Type 09/13/2023 Auto   Final        Diagnostic Results:          Narrative  Performed by: GEMUSE  Test Reason : Z01.818,    Vent. Rate : 050 BPM     Atrial Rate : 050 BPM     P-R Int : 154 ms          QRS Dur : 082 ms      QT Int : 406 ms       P-R-T Axes : 052 074 060 degrees     QTc Int : 370 ms    Sinus bradycardia with Premature atrial complexes  Otherwise normal ECG  No previous ECGs available  Confirmed by Mercedez PEREZ, Stanley DEL ROSARIO ([id number]) on 12/6/2023 5:24:05 PM    Referred By: MILLER NICOLAS           Confirmed By:Stanley Newsome MD      Specimen Collected: 12/05/23 09:36 CST               Reading Physician Reading Date Result Priority   Raz Liu MD  [phone] 12/5/2023 Routine     Narrative & Impression  EXAMINATION:  XR CHEST PA AND LATERAL     CLINICAL HISTORY:  Encounter for other preprocedural examination     TECHNIQUE:  PA and lateral views of the chest were performed.     COMPARISON:  None     FINDINGS:  Heart size normal.  Lungs clear.  No pneumothorax or pleural effusion.     Impression:     No acute findings.        Electronically signed by: Raz Liu  Date:                                            12/05/2023  Time:                                           09:45           Exam Ended: 12/05/23 09:42 CST                      Exam Ended: 12/05/23 09:42 CST             US Pelvis Complete Non OB    Narrative  EXAMINATION:  US PELVIS COMPLETE NON OB    CLINICAL HISTORY:  .  Lower abdominal pain, unspecified    COMPARISON:  No previous  similar    TECHNIQUE:  Real-time ultrasound images are captured and archived.    FINDINGS:  There is mild free fluid in the pelvis.    Urinary bladder is moderately distended and generally unremarkable.    The anteverted uterus measures 106 x 75 x 65 mm.  There are more than 3 hypoechoic mural and subserosal masses compatible with uterine leiomyomata, the largest of which measure 3.8 cm, 3.3 cm, and 2.3 cm maximum diameters respectively.  Endometrial echo measures 12 mm.  There is a small amount of nonspecific fluid within the endometrial cavity.    Right ovary measures 31 x 24 x 23 mm; left measures 28 x 27 x 19 mm.  There is no adnexal mass.  There is gross color Doppler flow to either ovary.    Impression  Uterine leiomyomata    Small amount nonspecific fluid within the endometrial cavity    Mild free fluid in the pelvis which may be physiologic      Electronically signed by: Juan Carlos Noriega  Date:    09/13/2023  Time:    10:57          Assessment/Plan:     Discussed proceeding with robotically assisted hysterectomy.      We will remove ovaries only if clinically indicated at the time of surgery.      The risk of surgery including anesthetic risk bleeding infection blood clots bowel bladder or ureter injury discussed.      Will give heparin 5000 units preoperatively.      pyridium prescribed.          Ac Flaherty MD  Obstetrics & Gynecology  [facility]

## 2023-12-05 NOTE — PATIENT INSTRUCTIONS
Discussed proceeding with robotically assisted hysterectomy.      We will remove ovaries only if clinically indicated at the time of surgery.      The risk of surgery including anesthetic risk bleeding infection blood clots bowel bladder or ureter injury discussed.      Will give heparin 5000 units preoperatively.      pyridium prescribed.   Physical Therapy Visit    Visit Type: Daily Treatment Note  Visit: 6  Referring Provider: Eric Gilbert MD  Medical Diagnosis (from order): Diagnosis Information    Diagnosis  V15.29 (ICD-9-CM) - Z98.890 (ICD-10-CM) - History of open reduction and internal fixation (ORIF) procedure  V15.51 (ICD-9-CM) - Z87.81 (ICD-10-CM) - History of fracture of left hip  719.45 (ICD-9-CM) - M25.552 (ICD-10-CM) - Left hip pain         SUBJECTIVE                                                                                                               Reports that she continues to have ups and downs and she is feeling like she is at the same level as before the injection.      OBJECTIVE                                                                                                                                       Treatment     Therapeutic Exercise  Portion of treatment provided by: DERIC Griffin under supervision of David Roman DPT    Recumbent bike 5 min  Leg press:  -DL 70# x 15  80#  X 15  -SL 40#  2 x 15  Standing weight shifts  Lateral, fwd  1 x 10 each - UE stability  Standing Heel raise   1 x 15 - UE stability  Deep squat  1 x 15 - UE Support  Staggered squat  2 x 10 UE support  Dumbbell carries  2 x 30 ft.  Left hand + cane  Line walk w/ cane  2 x 40 ft.  Seated resisted knee ext  2 x 12 red loop - bilateral    Held:  HS curl machine DL 35# x 20   At railing with no UE support:  -marching 2 x 20ft  -heel raises x 20  -side stepping 2 x 20ft   -mini squats 2 x 10   6\"-8\" step ups/downs x 4 laps   Chair sit to stands 2 x 6   Sled push 20# 4 x 20ft       Held:  Hip machine:  -L abd 30# x 10  -L ext 30# x 10     Manual Therapy   Soft tissue mobilization lateral hip    Skilled input: verbal instruction/cues    Writer verbally educated and received verbal consent for hand placement, positioning of patient, and techniques to be performed today from patient for clothing adjustments for techniques, therapist  position for techniques and hand placement and palpation for techniques as described above and how they are pertinent to the patient's plan of care.    Home Exercise Program  Access Code: [email or web address]  URL: [email or web address]  Date: 01/20/2023  Prepared by: Oh Wright    Exercises  ? Supine Hamstring Stretch with Strap - 1 x daily - 7 x weekly - 3 sets - 30 hold  ? Supine Piriformis Stretch - 1 x daily - 7 x weekly - 3 sets - 30 hold  ? Clamshell - 1 x daily - 7 x weekly - 2 sets - 20 reps  ? Small Range Straight Leg Raise - 1 x daily - 7 x weekly - 3 sets - 5 reps  ? Standing March with Counter Support - 1 x daily - 7 x weekly - 2 sets - 10 reps  ? Standing Hip Abduction with Counter Support - 1 x daily - 7 x weekly - 2 sets - 10 reps  ? Side Stepping with Counter Support - 1 x daily - 7 x weekly - 2 sets - 10 reps  ? Mini Squat - 1 x daily - 7 x weekly - 2 sets - 10 reps  ? Tandem Stance - 1 x daily - 7 x weekly - 2 sets - 15 hold  ? Single leg step up, slow lowering - 1 x daily - 7 x weekly - 2 sets - 10 reps  ? Heel Raises with Counter Support - 1 x daily - 7 x weekly - 2 sets - 10 reps  ? Sit to Stand - 1 x daily - 7 x weekly - 2 sets - 10 reps           ASSESSMENT                                                                                                            Patiet displays continued tenderness along gluteus medius and TFL that improved with soft tissue mobilization.  She has decreased antalgia this session but continues to utilize a cane and has decreased stride length with stance on her left.  Continued difficulty with negotiating stairs without compensation and requiring handrail for assist.  Education:   - Results of above outlined education: Verbalizes understanding    PLAN                                                                                                                           Suggestions for next session as indicated: Progress steps and gait  mechanics       Therapy procedure time and total treatment time can be found documented on the Time Entry flowsheet

## 2023-12-10 RX ORDER — POTASSIUM CHLORIDE 20 MEQ/1
20 TABLET, EXTENDED RELEASE ORAL DAILY
Qty: 30 TABLET | Refills: 0 | Status: SHIPPED | OUTPATIENT
Start: 2023-12-10

## 2023-12-18 ENCOUNTER — HOSPITAL ENCOUNTER (OUTPATIENT)
Facility: HOSPITAL | Age: 43
Discharge: HOME | End: 2023-12-19
Attending: OBSTETRICS & GYNECOLOGY | Admitting: OBSTETRICS & GYNECOLOGY
Payer: COMMERCIAL

## 2023-12-18 ENCOUNTER — ANESTHESIA (OUTPATIENT)
Dept: SURGERY | Facility: HOSPITAL | Age: 43
End: 2023-12-18
Payer: COMMERCIAL

## 2023-12-18 ENCOUNTER — ANESTHESIA EVENT (OUTPATIENT)
Dept: SURGERY | Facility: HOSPITAL | Age: 43
End: 2023-12-18
Payer: COMMERCIAL

## 2023-12-18 DIAGNOSIS — D25.9 UTERINE LEIOMYOMA: ICD-10-CM

## 2023-12-18 DIAGNOSIS — D25.1 LEIOMYOMA, INTRAMURAL: ICD-10-CM

## 2023-12-18 DIAGNOSIS — R10.2 PELVIC PAIN IN FEMALE: ICD-10-CM

## 2023-12-18 LAB
B-HCG UR QL: NEGATIVE
CTP QC/QA: YES
HCT VFR BLD AUTO: 40.1 % (ref 38–47)
HGB BLD-MCNC: 13.5 G/DL (ref 12–16)
INDIRECT COOMBS: NORMAL
RH BLD: NORMAL
SPECIMEN OUTDATE: NORMAL

## 2023-12-18 PROCEDURE — 27000510 HC BLANKET BAIR HUGGER ANY SIZE: Performed by: NURSE ANESTHETIST, CERTIFIED REGISTERED

## 2023-12-18 PROCEDURE — 71000015 HC POSTOP RECOV 1ST HR: Performed by: OBSTETRICS & GYNECOLOGY

## 2023-12-18 PROCEDURE — 85014 HEMATOCRIT: CPT | Performed by: OBSTETRICS & GYNECOLOGY

## 2023-12-18 PROCEDURE — 27000509 HC TUBE SALEM SUMP ANY SIZE: Performed by: NURSE ANESTHETIST, CERTIFIED REGISTERED

## 2023-12-18 PROCEDURE — 88302 TISSUE EXAM BY PATHOLOGIST: CPT | Mod: 26,,, | Performed by: PATHOLOGY

## 2023-12-18 PROCEDURE — 86850 RBC ANTIBODY SCREEN: CPT | Performed by: OBSTETRICS & GYNECOLOGY

## 2023-12-18 PROCEDURE — 63600175 PHARM REV CODE 636 W HCPCS: Performed by: ANESTHESIOLOGY

## 2023-12-18 PROCEDURE — 27000165 HC TUBE, ETT CUFFED: Performed by: NURSE ANESTHETIST, CERTIFIED REGISTERED

## 2023-12-18 PROCEDURE — 25000003 PHARM REV CODE 250: Performed by: OBSTETRICS & GYNECOLOGY

## 2023-12-18 PROCEDURE — 99900035 HC TECH TIME PER 15 MIN (STAT)

## 2023-12-18 PROCEDURE — 88307 TISSUE EXAM BY PATHOLOGIST: CPT | Mod: 26,,, | Performed by: PATHOLOGY

## 2023-12-18 PROCEDURE — 25000003 PHARM REV CODE 250: Performed by: NURSE ANESTHETIST, CERTIFIED REGISTERED

## 2023-12-18 PROCEDURE — 71000033 HC RECOVERY, INTIAL HOUR: Performed by: OBSTETRICS & GYNECOLOGY

## 2023-12-18 PROCEDURE — 37000009 HC ANESTHESIA EA ADD 15 MINS: Performed by: OBSTETRICS & GYNECOLOGY

## 2023-12-18 PROCEDURE — 36000713 HC OR TIME LEV V EA ADD 15 MIN: Performed by: OBSTETRICS & GYNECOLOGY

## 2023-12-18 PROCEDURE — 94761 N-INVAS EAR/PLS OXIMETRY MLT: CPT

## 2023-12-18 PROCEDURE — 88302 TISSUE EXAM BY PATHOLOGIST: CPT | Mod: TC,SUR | Performed by: OBSTETRICS & GYNECOLOGY

## 2023-12-18 PROCEDURE — C2628 CATHETER, OCCLUSION: HCPCS | Performed by: OBSTETRICS & GYNECOLOGY

## 2023-12-18 PROCEDURE — 88307 TISSUE EXAM BY PATHOLOGIST: CPT | Mod: TC,SUR | Performed by: OBSTETRICS & GYNECOLOGY

## 2023-12-18 PROCEDURE — 27000655: Performed by: NURSE ANESTHETIST, CERTIFIED REGISTERED

## 2023-12-18 PROCEDURE — 81025 URINE PREGNANCY TEST: CPT | Performed by: OBSTETRICS & GYNECOLOGY

## 2023-12-18 PROCEDURE — D9220A PRA ANESTHESIA: ICD-10-PCS | Mod: ANES,,, | Performed by: ANESTHESIOLOGY

## 2023-12-18 PROCEDURE — 63600175 PHARM REV CODE 636 W HCPCS: Performed by: OBSTETRICS & GYNECOLOGY

## 2023-12-18 PROCEDURE — D9220A PRA ANESTHESIA: Mod: CRNA,,, | Performed by: NURSE ANESTHETIST, CERTIFIED REGISTERED

## 2023-12-18 PROCEDURE — D9220A PRA ANESTHESIA: ICD-10-PCS | Mod: CRNA,,, | Performed by: NURSE ANESTHETIST, CERTIFIED REGISTERED

## 2023-12-18 PROCEDURE — 27000716 HC OXISENSOR PROBE, ANY SIZE: Performed by: NURSE ANESTHETIST, CERTIFIED REGISTERED

## 2023-12-18 PROCEDURE — 88307 SURGICAL PATHOLOGY: ICD-10-PCS | Mod: 26,,, | Performed by: PATHOLOGY

## 2023-12-18 PROCEDURE — D9220A PRA ANESTHESIA: Mod: ANES,,, | Performed by: ANESTHESIOLOGY

## 2023-12-18 PROCEDURE — 63600175 PHARM REV CODE 636 W HCPCS: Performed by: NURSE ANESTHETIST, CERTIFIED REGISTERED

## 2023-12-18 PROCEDURE — 71000016 HC POSTOP RECOV ADDL HR: Performed by: OBSTETRICS & GYNECOLOGY

## 2023-12-18 PROCEDURE — 25000003 PHARM REV CODE 250: Performed by: ANESTHESIOLOGY

## 2023-12-18 PROCEDURE — 36000712 HC OR TIME LEV V 1ST 15 MIN: Performed by: OBSTETRICS & GYNECOLOGY

## 2023-12-18 PROCEDURE — 88302 SURGICAL PATHOLOGY: ICD-10-PCS | Mod: 26,,, | Performed by: PATHOLOGY

## 2023-12-18 PROCEDURE — 27201423 OPTIME MED/SURG SUP & DEVICES STERILE SUPPLY: Performed by: OBSTETRICS & GYNECOLOGY

## 2023-12-18 PROCEDURE — 37000008 HC ANESTHESIA 1ST 15 MINUTES: Performed by: OBSTETRICS & GYNECOLOGY

## 2023-12-18 PROCEDURE — 58572 PR LAPAROSCOPY TOTAL HYSTERECTOMY UTERUS > 250 GRAM: ICD-10-PCS | Mod: ,,, | Performed by: OBSTETRICS & GYNECOLOGY

## 2023-12-18 PROCEDURE — 27000689 HC BLADE LARYNGOSCOPE ANY SIZE: Performed by: NURSE ANESTHETIST, CERTIFIED REGISTERED

## 2023-12-18 PROCEDURE — 58572 TLH UTERUS OVER 250 G: CPT | Mod: ,,, | Performed by: OBSTETRICS & GYNECOLOGY

## 2023-12-18 RX ORDER — ROCURONIUM BROMIDE 10 MG/ML
INJECTION, SOLUTION INTRAVENOUS
Status: DISCONTINUED | OUTPATIENT
Start: 2023-12-18 | End: 2023-12-18

## 2023-12-18 RX ORDER — HEPARIN SODIUM 5000 [USP'U]/ML
5000 INJECTION, SOLUTION INTRAVENOUS; SUBCUTANEOUS ONCE
Status: COMPLETED | OUTPATIENT
Start: 2023-12-18 | End: 2023-12-18

## 2023-12-18 RX ORDER — PROPOFOL 10 MG/ML
VIAL (ML) INTRAVENOUS
Status: DISCONTINUED | OUTPATIENT
Start: 2023-12-18 | End: 2023-12-18

## 2023-12-18 RX ORDER — ONDANSETRON 2 MG/ML
4 INJECTION INTRAMUSCULAR; INTRAVENOUS DAILY PRN
Status: DISCONTINUED | OUTPATIENT
Start: 2023-12-18 | End: 2023-12-18 | Stop reason: HOSPADM

## 2023-12-18 RX ORDER — ONDANSETRON 2 MG/ML
INJECTION INTRAMUSCULAR; INTRAVENOUS
Status: DISCONTINUED | OUTPATIENT
Start: 2023-12-18 | End: 2023-12-18

## 2023-12-18 RX ORDER — FENTANYL CITRATE 50 UG/ML
INJECTION, SOLUTION INTRAMUSCULAR; INTRAVENOUS
Status: DISCONTINUED | OUTPATIENT
Start: 2023-12-18 | End: 2023-12-18

## 2023-12-18 RX ORDER — IPRATROPIUM BROMIDE AND ALBUTEROL SULFATE 2.5; .5 MG/3ML; MG/3ML
3 SOLUTION RESPIRATORY (INHALATION) ONCE AS NEEDED
Status: DISCONTINUED | OUTPATIENT
Start: 2023-12-18 | End: 2023-12-18 | Stop reason: HOSPADM

## 2023-12-18 RX ORDER — MIDAZOLAM HYDROCHLORIDE 1 MG/ML
INJECTION INTRAMUSCULAR; INTRAVENOUS
Status: DISCONTINUED | OUTPATIENT
Start: 2023-12-18 | End: 2023-12-18

## 2023-12-18 RX ORDER — FUROSEMIDE 10 MG/ML
INJECTION INTRAMUSCULAR; INTRAVENOUS
Status: DISCONTINUED | OUTPATIENT
Start: 2023-12-18 | End: 2023-12-18

## 2023-12-18 RX ORDER — DIPHENHYDRAMINE HYDROCHLORIDE 50 MG/ML
25 INJECTION INTRAMUSCULAR; INTRAVENOUS EVERY 6 HOURS PRN
Status: DISCONTINUED | OUTPATIENT
Start: 2023-12-18 | End: 2023-12-18 | Stop reason: HOSPADM

## 2023-12-18 RX ORDER — SODIUM CHLORIDE, SODIUM LACTATE, POTASSIUM CHLORIDE, CALCIUM CHLORIDE 600; 310; 30; 20 MG/100ML; MG/100ML; MG/100ML; MG/100ML
INJECTION, SOLUTION INTRAVENOUS CONTINUOUS
Status: DISCONTINUED | OUTPATIENT
Start: 2023-12-18 | End: 2023-12-18

## 2023-12-18 RX ORDER — MEPERIDINE HYDROCHLORIDE 25 MG/ML
25 INJECTION INTRAMUSCULAR; INTRAVENOUS; SUBCUTANEOUS ONCE AS NEEDED
Status: DISCONTINUED | OUTPATIENT
Start: 2023-12-18 | End: 2023-12-18 | Stop reason: HOSPADM

## 2023-12-18 RX ORDER — FAMOTIDINE 20 MG/1
20 TABLET, FILM COATED ORAL 2 TIMES DAILY
Status: DISCONTINUED | OUTPATIENT
Start: 2023-12-18 | End: 2023-12-19 | Stop reason: HOSPADM

## 2023-12-18 RX ORDER — ONDANSETRON 2 MG/ML
4 INJECTION INTRAMUSCULAR; INTRAVENOUS EVERY 6 HOURS PRN
Status: DISCONTINUED | OUTPATIENT
Start: 2023-12-18 | End: 2023-12-19 | Stop reason: HOSPADM

## 2023-12-18 RX ORDER — DIMENHYDRINATE 50 MG
50 TABLET ORAL ONCE
Status: COMPLETED | OUTPATIENT
Start: 2023-12-18 | End: 2023-12-18

## 2023-12-18 RX ORDER — HYDROMORPHONE HYDROCHLORIDE 2 MG/ML
0.5 INJECTION, SOLUTION INTRAMUSCULAR; INTRAVENOUS; SUBCUTANEOUS EVERY 5 MIN PRN
Status: DISCONTINUED | OUTPATIENT
Start: 2023-12-18 | End: 2023-12-18 | Stop reason: HOSPADM

## 2023-12-18 RX ORDER — HYDROMORPHONE HYDROCHLORIDE 2 MG/ML
INJECTION, SOLUTION INTRAMUSCULAR; INTRAVENOUS; SUBCUTANEOUS
Status: DISCONTINUED | OUTPATIENT
Start: 2023-12-18 | End: 2023-12-18

## 2023-12-18 RX ORDER — TRAMADOL HYDROCHLORIDE 50 MG/1
50 TABLET ORAL EVERY 4 HOURS PRN
Status: DISCONTINUED | OUTPATIENT
Start: 2023-12-18 | End: 2023-12-19 | Stop reason: HOSPADM

## 2023-12-18 RX ORDER — MORPHINE SULFATE 10 MG/ML
4 INJECTION INTRAMUSCULAR; INTRAVENOUS; SUBCUTANEOUS
Status: DISCONTINUED | OUTPATIENT
Start: 2023-12-18 | End: 2023-12-19 | Stop reason: HOSPADM

## 2023-12-18 RX ORDER — LIDOCAINE HYDROCHLORIDE 20 MG/ML
INJECTION, SOLUTION EPIDURAL; INFILTRATION; INTRACAUDAL; PERINEURAL
Status: DISCONTINUED | OUTPATIENT
Start: 2023-12-18 | End: 2023-12-18

## 2023-12-18 RX ORDER — MORPHINE SULFATE 10 MG/ML
4 INJECTION INTRAMUSCULAR; INTRAVENOUS; SUBCUTANEOUS EVERY 5 MIN PRN
Status: DISCONTINUED | OUTPATIENT
Start: 2023-12-18 | End: 2023-12-18 | Stop reason: HOSPADM

## 2023-12-18 RX ORDER — DEXAMETHASONE SODIUM PHOSPHATE 4 MG/ML
INJECTION, SOLUTION INTRA-ARTICULAR; INTRALESIONAL; INTRAMUSCULAR; INTRAVENOUS; SOFT TISSUE
Status: DISCONTINUED | OUTPATIENT
Start: 2023-12-18 | End: 2023-12-18

## 2023-12-18 RX ORDER — FAMOTIDINE 20 MG/1
20 TABLET, FILM COATED ORAL
Status: COMPLETED | OUTPATIENT
Start: 2023-12-18 | End: 2023-12-18

## 2023-12-18 RX ORDER — BISACODYL 10 MG
10 SUPPOSITORY, RECTAL RECTAL DAILY PRN
Status: DISCONTINUED | OUTPATIENT
Start: 2023-12-18 | End: 2023-12-19 | Stop reason: HOSPADM

## 2023-12-18 RX ADMIN — TRAMADOL HYDROCHLORIDE 50 MG: 50 TABLET, COATED ORAL at 02:12

## 2023-12-18 RX ADMIN — ROCURONIUM BROMIDE 30 MG: 10 INJECTION, SOLUTION INTRAVENOUS at 07:12

## 2023-12-18 RX ADMIN — SODIUM CHLORIDE, POTASSIUM CHLORIDE, SODIUM LACTATE AND CALCIUM CHLORIDE: 600; 310; 30; 20 INJECTION, SOLUTION INTRAVENOUS at 06:12

## 2023-12-18 RX ADMIN — LIDOCAINE HYDROCHLORIDE 50 MG: 20 INJECTION, SOLUTION INTRAVENOUS at 07:12

## 2023-12-18 RX ADMIN — SUGAMMADEX 200 MG: 100 INJECTION, SOLUTION INTRAVENOUS at 10:12

## 2023-12-18 RX ADMIN — DEXAMETHASONE SODIUM PHOSPHATE 8 MG: 4 INJECTION, SOLUTION INTRA-ARTICULAR; INTRALESIONAL; INTRAMUSCULAR; INTRAVENOUS; SOFT TISSUE at 07:12

## 2023-12-18 RX ADMIN — ONDANSETRON 4 MG: 2 INJECTION INTRAMUSCULAR; INTRAVENOUS at 07:12

## 2023-12-18 RX ADMIN — CEFAZOLIN SODIUM 2 G: 1 POWDER, FOR SOLUTION INTRAMUSCULAR; INTRAVENOUS at 07:12

## 2023-12-18 RX ADMIN — HYDROMORPHONE HYDROCHLORIDE 0.5 MG: 2 INJECTION INTRAMUSCULAR; INTRAVENOUS; SUBCUTANEOUS at 11:12

## 2023-12-18 RX ADMIN — FENTANYL CITRATE 100 MCG: 50 INJECTION INTRAMUSCULAR; INTRAVENOUS at 07:12

## 2023-12-18 RX ADMIN — SODIUM CHLORIDE: 9 INJECTION, SOLUTION INTRAVENOUS at 07:12

## 2023-12-18 RX ADMIN — FAMOTIDINE 20 MG: 20 TABLET ORAL at 09:12

## 2023-12-18 RX ADMIN — SODIUM CHLORIDE, POTASSIUM CHLORIDE, SODIUM LACTATE AND CALCIUM CHLORIDE: 600; 310; 30; 20 INJECTION, SOLUTION INTRAVENOUS at 04:12

## 2023-12-18 RX ADMIN — FAMOTIDINE 20 MG: 20 TABLET ORAL at 06:12

## 2023-12-18 RX ADMIN — PROPOFOL 200 MG: 10 INJECTION, EMULSION INTRAVENOUS at 07:12

## 2023-12-18 RX ADMIN — HYDROMORPHONE HYDROCHLORIDE 1 MG: 2 INJECTION, SOLUTION INTRAMUSCULAR; INTRAVENOUS; SUBCUTANEOUS at 08:12

## 2023-12-18 RX ADMIN — HEPARIN SODIUM 5000 UNITS: 5000 INJECTION, SOLUTION INTRAVENOUS; SUBCUTANEOUS at 06:12

## 2023-12-18 RX ADMIN — ROCURONIUM BROMIDE 20 MG: 10 INJECTION, SOLUTION INTRAVENOUS at 08:12

## 2023-12-18 RX ADMIN — DIMENHYDRINATE 50 MG: 50 TABLET ORAL at 06:12

## 2023-12-18 RX ADMIN — MIDAZOLAM 2 MG: 1 INJECTION INTRAMUSCULAR; INTRAVENOUS at 07:12

## 2023-12-18 RX ADMIN — TRAMADOL HYDROCHLORIDE 50 MG: 50 TABLET, COATED ORAL at 09:12

## 2023-12-18 RX ADMIN — FUROSEMIDE 10 MG: 10 INJECTION, SOLUTION INTRAMUSCULAR; INTRAVENOUS at 09:12

## 2023-12-18 NOTE — ANESTHESIA POSTPROCEDURE EVALUATION
Anesthesia Post Evaluation    Patient: Genoveva Ortiz    Procedure(s) Performed: Procedure(s) (LRB):  XI ROBOTIC HYSTERECTOMY (N/A)  CYSTOSCOPY (N/A)    Final Anesthesia Type: general      Patient location during evaluation: PACU  Patient participation: Yes- Able to Participate  Level of consciousness: awake and alert and oriented  Post-procedure vital signs: reviewed and stable  Pain management: adequate  Airway patency: patent  HARIS mitigation strategies: Multimodal analgesia  PONV status at discharge: No PONV  Anesthetic complications: no      Cardiovascular status: hemodynamically stable  Respiratory status: unassisted and spontaneous ventilation  Hydration status: euvolemic  Follow-up not needed.              Vitals Value Taken Time   /69 12/18/23 1046   Temp 36.6 °C (97.8 °F) 12/18/23 1042   Pulse 88 12/18/23 1048   Resp 15 12/18/23 1048   SpO2 100 % 12/18/23 1048   Vitals shown include unvalidated device data.      No case tracking events are documented in the log.      Pain/Chas Score: No data recorded

## 2023-12-18 NOTE — ANESTHESIA PROCEDURE NOTES
Intubation    Date/Time: 12/18/2023 7:35 AM    Performed by: Wesley Munoz CRNA  Authorized by: Emanuel Solano DO    Intubation:     Induction:  Intravenous    Intubated:  Postinduction    Mask Ventilation:  Easy mask    Attempts:  1    Attempted By:  CRNA    Method of Intubation:  Direct    Blade:  Ananya 3    Laryngeal View Grade: Grade I - full view of cords      Difficult Airway Encountered?: No      Complications:  None    Airway Device:  Oral endotracheal tube    Airway Device Size:  7.0    Style/Cuff Inflation:  Cuffed (inflated to minimal occlusive pressure)    Tube secured:  22    Secured at:  The lips    Placement Verified By:  Capnometry and Revisualization with laryngoscopy    Complicating Factors:  None    Findings Post-Intubation:  BS equal bilateral and atraumatic/condition of teeth unchanged

## 2023-12-18 NOTE — TRANSFER OF CARE
"Anesthesia Transfer of Care Note    Patient: Genoveva Ortiz    Procedure(s) Performed: Procedure(s) (LRB):  XI ROBOTIC HYSTERECTOMY (N/A)  CYSTOSCOPY (N/A)    Patient location: PACU    Anesthesia Type: general    Transport from OR: Transported from OR on 6-10 L/min O2 by face mask with adequate spontaneous ventilation    Post pain: adequate analgesia    Post assessment: no apparent anesthetic complications    Post vital signs: stable    Level of consciousness: responds to stimulation, awake and sedated    Nausea/Vomiting: no nausea/vomiting    Complications: none    Transfer of care protocol was followed    Last vitals: Visit Vitals  /64 (BP Location: Right arm, Patient Position: Lying)   Pulse 75   Temp 36.6 °C (97.8 °F) (Oral)   Resp 11   Ht 5' 5" (1.651 m)   Wt 74.8 kg (165 lb)   SpO2 100%   Breastfeeding No   BMI 27.46 kg/m²     "

## 2023-12-18 NOTE — ANESTHESIA PREPROCEDURE EVALUATION
12/18/2023  Genoveva Ortiz is a 43 y.o., female.      Pre-op Assessment    I have reviewed the Patient Summary Reports.     I have reviewed the Nursing Notes. I have reviewed the NPO Status.   I have reviewed the Medications.     Review of Systems  Anesthesia Hx:  No problems with previous Anesthesia             Denies Family Hx of Anesthesia complications.    Denies Personal Hx of Anesthesia complications.                    Social:  Non-Smoker, No Alcohol Use       Cardiovascular:  Exercise tolerance: good                  May-Thurner Syndrome                         OB/GYN/PEDS:  Chronic pelvic pain, leiomyoma               Physical Exam  General: Well nourished, Cooperative and Alert    Airway:  Mallampati: II   Mouth Opening: Normal  TM Distance: Normal  Tongue: Normal  Neck ROM: Normal ROM    Dental:  Intact    Chest/Lungs:  Clear to auscultation, Normal Respiratory Rate    Heart:  Rate: Normal  Rhythm: Regular Rhythm        Chemistry        Component Value Date/Time     12/05/2023 0956    K 3.6 12/05/2023 0956     (H) 12/05/2023 0956    CO2 31 12/05/2023 0956    BUN 18 12/05/2023 0956    CREATININE 1.06 (H) 12/05/2023 0956    GLU 86 12/05/2023 0956        Component Value Date/Time    CALCIUM 9.0 12/05/2023 0956    ALKPHOS 61 12/05/2023 0956    AST 23 12/05/2023 0956    ALT 30 12/05/2023 0956    BILITOT 0.6 12/05/2023 0956    ESTGFRAFRICA 90 10/17/2021 1208    EGFRNONAA 61 07/16/2020 0709        Lab Results   Component Value Date    WBC 5.37 12/05/2023    HGB 13.5 12/05/2023    HCT 38.9 12/05/2023     12/05/2023     Results for orders placed or performed in visit on 12/05/23   EKG 12-lead    Collection Time: 12/05/23  9:36 AM    Narrative    Test Reason : Z01.818,    Vent. Rate : 050 BPM     Atrial Rate : 050 BPM     P-R Int : 154 ms          QRS Dur : 082 ms      QT Int :  406 ms       P-R-T Axes : 052 074 060 degrees     QTc Int : 370 ms    Sinus bradycardia with Premature atrial complexes  Otherwise normal ECG  No previous ECGs available  Confirmed by Mercedez PEREZ, Stanley DEL ROSARIO ([id number]) on 12/6/2023 5:24:05 PM    Referred By: MILLER NICOLAS           Confirmed By:Stanley Newsome MD         Anesthesia Plan  Type of Anesthesia, risks & benefits discussed:    Anesthesia Type: Gen ETT  Intra-op Monitoring Plan: Standard ASA Monitors  Post Op Pain Control Plan: multimodal analgesia  Induction:  IV  Airway Plan: Direct, Post-Induction  Informed Consent: Informed consent signed with the Patient and all parties understand the risks and agree with anesthesia plan.  All questions answered.   ASA Score: 2  Day of Surgery Review of History & Physical: H&P Update referred to the surgeon/provider.I have interviewed and examined the patient. I have reviewed the patient's H&P dated: There are no significant changes.     Ready For Surgery From Anesthesia Perspective.     .

## 2023-12-18 NOTE — OP NOTE
Dr. Flaherty 12/18/2023.      Preoperative diagnosis-intermittent pelvic pain.  Patient has had previous ablation therefore no further menses.    Known uterine leiomyoma.      Postoperative diagnosis-same    Findings-upon history dermoscopy examination uterus was enlarged approximately 11 week size irregular contour with numerous intramural leiomyoma she has had a previous bilateral salpingectomy.      On the patient's right there was absolutely no tube present.  On the patient's left there was no fimbria identified there was a small 1 cm area of pedunculated tissue at the distal end near the previously amputated tube which could be some fimbria.  This small 1 cm mass tissue was .  However no other fallopian tube remnants could be found.      Ovaries normal to appearance.  Recent ovulation site on left ovary.  However no evidence of endometriosis.  Ovaries were left intact.    Procedure-robotically assisted hysterectomy.  Cystoscopy done postprocedure revealed efflux of yellow orange urine through each ureteral orifice no evidence of bladder injury.    How the procedure was performed.    Patient was placed under general anesthesia by anesthesiologist.    She was prepped and draped in usual manner for abdominal and vaginal surgery.  She was given 2  gms of Ancef. Time-out was called patient and procedure reviewed.  Bladder was drained with a Huang.  Speculum inserted.  Upper lip of the cervix grasped with a sharp tooth tenaculum.    The uterus and cervix were sounded to 10  Cm.  Cervix dilated to a 8mm Hegar dilator.    0 Vicryl stick ties were placed at 3 and 9:00 a.m. of the cervix and tagged    Ten cm intrauterine manipulator with cervical ring was placed in usual manner.  Intrauterine bulb insufflated following by insufflation of the vaginal bulb.    Physician re gloved.  Attention was turned abdominally.      A 2 cm incision was made just above the umbilicus.  Abdomen was lifted upward with towel clamps.  Varies  needle was inserted without difficulty.  CO2 applied at 6 L per minute  The abdomen was insufflated without difficulty.  Intra-abdominal pressure remained less than 15 mm of mercury throughout insufflation.    8 mm trocar was inserted without difficulty.  Scope inserted through sleep noted to be intra-abdominally.  No evidence of abdominal or pelvic adhesions.  1 cm incisions were made in the left upper quadrant, right upper quadrant and right mid quadrant.  Thereafter 8 mm trocars were then inserted under direct visualization.      The [employer] robot was then docked from  a left lateral side position. The #2 Laparoscopic arm attached to the supraumbilical port.  The #1 arm was  attached to the left upper quadrant port .  The #3 arm was attached to the right upper quadrant port and the #4 arm was attached to the right  mid quadrant port.    Through the #1 arm tissue vessel sealer was inserted. Through the  #3 arm monopolar cautery scissors were inserted and through the #4 arm bipolar fenestrated forceps were inserted.    Instruments were then directed to the fundal aspect of the uterus under direct visualization.  I then assumed control at the console    My vaginal assistant then deviated the uterus up into the patient's right.  I  used the fenestrated forceps to lift the left ovary upward   The left utero-ovarian ligament and vessels were grasped with the tissue vessel sealer cauterized and transected with enclosed blade.  Dissection was carried across the left  utero-ovarian ligament and vessels.  Thereafter the round ligament was grasped with tissue vessel sealer, cauterized and transected in the midportion.  Thereafter the dissection was carried down the upper 2/3 of the broad ligament along the left side of the uterus cauterizing with tissue vessel sealer and transecting with enclosed blade.    It should be noted a small 1 cm area of tissue was taken from the distal aspect of the previously transected fallopian  tube.  The small amount of tissue will be sent for identification of possible fimbria however no fimbria were grossly seen.    Attention was then turned to the right side.  The right ovary was lifted upward with fenestrated forceps.  The right  utero-ovarian ligament and vessels were grasped with the tissue vessel sealer cauterized and transected.  Dissection was then carried across the midportion of the right round ligament and down the upper 2/3 of the right broad ligament.    There was no evidence of any fallopian tube on the right.    At this time my vaginal assistant deviated the uterus posteriorly.  Using the fenestrated forceps I lifted upward on the bladder flap peritoneum and using monopolar scissors began developing the bladder flap anteriorly. The bladder flap peritoneum was lifted upward and the bladder flap developed by blunt dissection with the tissue vessel sealer beneath the bladder.  Once the bladder flap was bluntly dissected, the vaginal ring was easily through the vaginal mucosa anteriorly.    I then performed an anterior colpotomy cauterizing with monopolar cautery scissors along the upper inner bevel of the cervical ring.  Dissection with monopolar cautery scissors was begun in the midline and carried out to the right and left lateral sides anteriorly.    My vaginal assistant then deviated the uterus anteriorly and all instruments were placed in the posterior cul-de-sac.  I used the fenestrated forceps to push upward on the uterine fundus.  The cervical ring was easily seen posteriorly.  I then cauterized along the upper bevel of the cervical ring posteriorly beginning in the midline and dissecting to the right and left side around the ring.    I then had my assistant deviate the uterus upward and to the patient's right putting stretch on the left uterine vessels.  Using tissue vessel sealer the uterine vessels along the lower uterine segment and cervix were grasped cauterized and transected  with enclosed blade. The dissection was carried down the left lateral aspect of the lower uterine segment and cervix alternating with tissue vessel sealer and monopolar cautery scissors until the cervix was completely released from its cervical attachment on the patient's left.    Attention was then turned to the patient's right as my vaginal assistant deviated the uterus to the left putting stretch on the right uterine vessels.  Uterine vessels were grasped with tissue vessel sealer cauterized and transected with enclosed blade.  Dissection was carried down the lateral aspect of the right lower uterine segment and cervix alternating with tissue vessel sealer and monopolar cautery scissors until the cervix was completely released from its vaginal attachment on the right.    My vaginal assistant then pulled the uterus through the vaginal cuff.  Vaginal bulb was replaced in the abdomen re insufflated.    My abdominal assistant then removed the monopolar scissors and replace this with a ProGrasp instrument.  The tissue vessel sealer was removed and my abdominal assistant placed a suction irrigation apparatus to thoroughly irrigate and suction the vaginal cuff area.  Thereafter a 0 Vicryl suture was passed through the left upper quadrant port and needle  inserted.    I lifted upward on the bladder flap peritoneum with fenestrated bipolar forceps.  I then used the ProGrasp instrument to lift upward on the anterior aspect of the vaginal cuff.  The left lateral aspect of the vaginal cuff was then closed using interrupted number 0 Vicryl sutures.  Two sutures were placed along the left lateral aspect of the vaginal cuff.  This needle was removed as a 2nd suture was passed.  The 2nd suture was used to place interrupted sutures along the right lateral aspect of the vaginal cuff and tied manually.  Second needle was removed as a 3rd suture was passed.  The 3rd suture was used to continue closing the vaginal cuff in the  midportion with interrupted sutures tied manually.  Third needle was removed.  The pelvis was then thoroughly irrigated and suctioned with saline.  Examined under low pressure  Excellent hemostasis was noted.  Nevertheless I decided place FloSeal along the vaginal cuff area.  Patient had been given heparin preoperatively because of history of May Thurner'ssyndrome.  A 4th suture was passed.  The bladder flap peritoneum was then tacked to the posterior peritoneum across the vaginal cuff area with a figure-of-eight 0 Vicryl suture.  Again excellent hemostasis was noted.    Instruments were removed and CO2 allowed to escape the camera was shut off.    I then re scrubbed, re gowned and gloved.    I  performed a cystoscopic examination.  There was no evidence of bladder injury a flux of yellow orange urine was noted through each ureteral orifice.  Patient had been given Pyridium prior to the procedure.  Huang catheter was reinserted.    Physician re gloved attention was turned abdominally.  Ports were removed CO2 allowed to escape.  The skin incisions were reapproximated with interrupted number 4-0 Vicryl subcuticular stitches.    Estimated blood loss 40 cc    All needle instruments and sponge counts were reported as correct    She was returned recovery room in good condition.

## 2023-12-18 NOTE — INTERVAL H&P NOTE
The patient has been examined and the H&P has been reviewed:    I concur with the findings and no changes have occurred since H&P was written.    Anesthesia/Surgery risks, benefits and alternative options discussed and understood by patient/family.

## 2023-12-19 VITALS
RESPIRATION RATE: 19 BRPM | HEIGHT: 65 IN | OXYGEN SATURATION: 98 % | BODY MASS INDEX: 27.49 KG/M2 | TEMPERATURE: 99 F | WEIGHT: 165 LBS | DIASTOLIC BLOOD PRESSURE: 79 MMHG | SYSTOLIC BLOOD PRESSURE: 133 MMHG | HEART RATE: 86 BPM

## 2023-12-19 LAB
BASOPHILS # BLD AUTO: 0.01 K/UL (ref 0–0.2)
BASOPHILS NFR BLD AUTO: 0.1 % (ref 0–1)
DIFFERENTIAL METHOD BLD: ABNORMAL
EOSINOPHIL # BLD AUTO: 0.03 K/UL (ref 0–0.5)
EOSINOPHIL NFR BLD AUTO: 0.3 % (ref 1–4)
ERYTHROCYTE [DISTWIDTH] IN BLOOD BY AUTOMATED COUNT: 12.5 % (ref 11.5–14.5)
HCT VFR BLD AUTO: 36.6 % (ref 38–47)
HGB BLD-MCNC: 12.6 G/DL (ref 12–16)
IMM GRANULOCYTES # BLD AUTO: 0.03 K/UL (ref 0–0.04)
IMM GRANULOCYTES NFR BLD: 0.3 % (ref 0–0.4)
LYMPHOCYTES # BLD AUTO: 2.28 K/UL (ref 1–4.8)
LYMPHOCYTES NFR BLD AUTO: 25.1 % (ref 27–41)
MCH RBC QN AUTO: 31.3 PG (ref 27–31)
MCHC RBC AUTO-ENTMCNC: 34.4 G/DL (ref 32–36)
MCV RBC AUTO: 91 FL (ref 80–96)
MONOCYTES # BLD AUTO: 0.93 K/UL (ref 0–0.8)
MONOCYTES NFR BLD AUTO: 10.3 % (ref 2–6)
MPC BLD CALC-MCNC: 9.6 FL (ref 9.4–12.4)
NEUTROPHILS # BLD AUTO: 5.79 K/UL (ref 1.8–7.7)
NEUTROPHILS NFR BLD AUTO: 63.9 % (ref 53–65)
NRBC # BLD AUTO: 0 X10E3/UL
NRBC, AUTO (.00): 0 %
PLATELET # BLD AUTO: 145 K/UL (ref 150–400)
RBC # BLD AUTO: 4.02 M/UL (ref 4.2–5.4)
WBC # BLD AUTO: 9.07 K/UL (ref 4.5–11)

## 2023-12-19 PROCEDURE — 85025 COMPLETE CBC W/AUTO DIFF WBC: CPT | Performed by: OBSTETRICS & GYNECOLOGY

## 2023-12-19 RX ORDER — TRAMADOL HYDROCHLORIDE 50 MG/1
50 TABLET ORAL EVERY 6 HOURS PRN
Qty: 8 TABLET | Refills: 0 | Status: SHIPPED | OUTPATIENT
Start: 2023-12-19

## 2023-12-19 NOTE — PLAN OF CARE
[facility] - [facility] Telemetry  Discharge Final Note    Primary Care Provider: Michele Loera MD    Expected Discharge Date:     Final Discharge Note (most recent)       Final Note - 12/19/23 1204          Final Note    Assessment Type Final Discharge Note     Anticipated Discharge Disposition Home or Self Care                     Important Message from Medicare             Dc home prior to being seen by cm. No consults and no dc needs identified.

## 2023-12-19 NOTE — DISCHARGE SUMMARY
Final diagnosis post robotically assisted hysterectomy.      Hospital course    Patient admitted 12/18/2023 for robotic hysterectomy secondary to uterine leiomyoma and pelvic pain.  See history and physical for details.      She was taken to the operating room where procedure was performed.  She had had a previous bilateral salpingectomy.  Therefore fallopian tubes were not removed.  Ovaries normal to appearance and left intact.      Cystoscopy postprocedure revealed no evidence of bladder injury efflux of yellow orange urine was noted through each ureteral orifice.      Postoperatively Huang catheter was removed in the recovery room.  Later in the evening she began ambulating and voiding well.      She was progressed to a regular diet the evening of surgery.  She tolerated this well.      On postoperative day 1 she was up ambulating in the halls.  She would tolerated breakfast well.  She was ready for discharge home.  Postoperative hematocrit was 36.6%.      Lungs were clear abdomen soft bowel sounds active.      We discussed discharge home.  Sedentary activity instructions given.  No driving for at least 1-2 weeks.      Instructed to return to the emergency room if any chills fever vomiting or heavy vaginal bleeding occurs.    She has a follow-up appointment January 3rd.    She will continue aspirin daily.      Prescribed Ultram 50 mg 1 q.6 hours p.r.n. for pain.  Ten tablets given

## 2023-12-19 NOTE — PROGRESS NOTES
5 pm post op note     Surgery discussed     Alert no complaints, voiding Amb to bathroom     Hungry bs present     Rx reg diet     Continue present orders

## 2023-12-19 NOTE — BRIEF OP NOTE
[facility]  Brief Operative Note    SUMMARY     Surgery Date: 12/18/2023     Surgeon(s) and Role:     * Ac Flaherty MD - Primary    Assisting Surgeon: None    Pre-op Diagnosis:  Pelvic pain in female [R10.2]  Leiomyoma, intramural [D25.1]    Post-op Diagnosis:  Post-Op Diagnosis Codes:     * Pelvic pain in female [R10.2]     * Leiomyoma, intramural [D25.1]    Procedure(s) (LRB):  XI ROBOTIC HYSTERECTOMY (N/A)  CYSTOSCOPY (N/A)    Anesthesia: General    Implants:  * No implants in log *    Operative Findings:  Uterine leiomyoma approximately 10 week size.  Previously bilateral salpingectomy.  Ovaries normal to appearance.    Procedure robotic hysterectomy.  Ovaries left intact.  Cystoscopy postprocedure revealed efflux of yellow orange urine through each ureteral orifice no evidence of bladder injury.    Estimated Blood Loss: 40 mL    Estimated Blood Loss has been documented.         Specimens:   Specimen (24h ago, onward)       Start     Ordered    12/18/23 0943  Surgical Pathology  RELEASE UPON ORDERING         12/18/23 0943                    [id number]

## 2023-12-20 LAB
ESTROGEN SERPL-MCNC: NORMAL PG/ML
INSULIN SERPL-ACNC: NORMAL U[IU]/ML
LAB AP GROSS DESCRIPTION: NORMAL
LAB AP LABORATORY NOTES: NORMAL
T3RU NFR SERPL: NORMAL %

## 2023-12-21 ENCOUNTER — PATIENT MESSAGE (OUTPATIENT)
Dept: OBSTETRICS AND GYNECOLOGY | Facility: CLINIC | Age: 43
End: 2023-12-21
Payer: COMMERCIAL

## 2023-12-26 ENCOUNTER — TELEPHONE (OUTPATIENT)
Dept: OBSTETRICS AND GYNECOLOGY | Facility: CLINIC | Age: 43
End: 2023-12-26
Payer: COMMERCIAL

## 2023-12-26 NOTE — TELEPHONE ENCOUNTER
----- Message from Debra Menon sent at 12/26/2023  8:15 AM CST -----  Regarding: RETURN CALL  PATIENT CALL AND WOULD LIKE A CALL BACK HAVING PAIN, FROM SURGERY, CALL BACK NUMBER [phone]

## 2024-01-04 ENCOUNTER — OFFICE VISIT (OUTPATIENT)
Dept: OBSTETRICS AND GYNECOLOGY | Facility: CLINIC | Age: 44
End: 2024-01-04
Payer: COMMERCIAL

## 2024-01-04 ENCOUNTER — TELEPHONE (OUTPATIENT)
Dept: OBSTETRICS AND GYNECOLOGY | Facility: CLINIC | Age: 44
End: 2024-01-04
Payer: COMMERCIAL

## 2024-01-04 VITALS — SYSTOLIC BLOOD PRESSURE: 118 MMHG | DIASTOLIC BLOOD PRESSURE: 68 MMHG

## 2024-01-04 DIAGNOSIS — Z09 POSTOP CHECK: Primary | ICD-10-CM

## 2024-01-04 PROCEDURE — 99024 POSTOP FOLLOW-UP VISIT: CPT | Mod: ,,, | Performed by: OBSTETRICS & GYNECOLOGY

## 2024-01-04 PROCEDURE — 99213 OFFICE O/P EST LOW 20 MIN: CPT | Mod: PBBFAC | Performed by: OBSTETRICS & GYNECOLOGY

## 2024-01-04 NOTE — PATIENT INSTRUCTIONS
Discussed resolving vaginal cuff hematoma.  She will continue to use pads as needed.    We discussed if any heavy bleeding soaking pads to present to the emergency room.    She will call me if [facility] emergency room visit needed.    Otherwise follow-up in 2 weeks

## 2024-01-04 NOTE — TELEPHONE ENCOUNTER
----- Message from Poonam Wade sent at 1/4/2024 11:54 AM CST -----  Called stating that she was having some bleeding and it was worse today. Call back # [phone]

## 2024-01-04 NOTE — PROGRESS NOTES
Subjective:       Patient ID: Genoveva Ortiz is a 43 y.o. female.    Chief Complaint: Post-op Evaluation (2 week post op after RAH-c/o vaginal spotting since 12-25-23-light but today more and had to put on pad. She thought it was bladder spasms and she took 3 days of azo. )    Presents 2-1/2 weeks postop    She has had some light vaginal spotting however heavier bleeding noted today.  Has still used only 2 panty liners today.      Review of Systems      Objective:      Physical Exam  Genitourinary:     Comments: Vaginal vault inspected.  There was approximately 5 cc of old blood in the apex of the vaginal vault.  This was cleaned with sponge stick.  Vaginal cuff was carefully examined no separation was noted bimanual exam was unremarkable.        Assessment:       1. Postop check        Plan:       Patient Instructions   Discussed resolving vaginal cuff hematoma.  She will continue to use pads as needed.    We discussed if any heavy bleeding soaking pads to present to the emergency room.    She will call me if [facility] room visit needed.    Otherwise follow-up in 2 weeks

## 2024-01-04 NOTE — LETTER
January 4, 2024      [facility]  [address] [place] [id number]  Phone: [phone]  Fax: [phone]       Patient: Genoveva Ortiz     Date of Visit: 12/18/2023    To Whom It May Concern:    Shonda González was at surgery with Genoveva on 12/18/2023 and 12/19/2023.  If you have any questions or concerns, or if I can be of further assistance, please do not hesitate to contact me.    Sincerely,    BRIANA Weeks

## 2024-01-18 ENCOUNTER — OFFICE VISIT (OUTPATIENT)
Dept: OBSTETRICS AND GYNECOLOGY | Facility: CLINIC | Age: 44
End: 2024-01-18
Payer: COMMERCIAL

## 2024-01-18 VITALS — DIASTOLIC BLOOD PRESSURE: 60 MMHG | SYSTOLIC BLOOD PRESSURE: 110 MMHG

## 2024-01-18 DIAGNOSIS — Z09 POSTOP CHECK: Primary | ICD-10-CM

## 2024-01-18 PROCEDURE — 99213 OFFICE O/P EST LOW 20 MIN: CPT | Mod: PBBFAC | Performed by: OBSTETRICS & GYNECOLOGY

## 2024-01-18 PROCEDURE — 99024 POSTOP FOLLOW-UP VISIT: CPT | Mod: ,,, | Performed by: OBSTETRICS & GYNECOLOGY

## 2024-01-18 NOTE — PROGRESS NOTES
Subjective:       Patient ID: Genoveva Ortiz is a 43 y.o. female.    Chief Complaint: Post-op Evaluation (Here 4 weeks post op after RAH-pt still having bleeding after voiding/or bm-doesn't have to wear a pad. )    Presents 4 weeks following robotic hysterectomy ovaries left intact.      She has had some postoperative vaginal cuff bleeding thought to be resolving hematoma.    Only very light spotting intermittently at times now.  She has not having to use pads on a daily basis.      Review of Systems      Objective:      Physical Exam  Genitourinary:     Comments: On exam today vaginal cuff appears to be healing very well.  There was no evidence of granulation tissue.  At the apex of the vaginal vault along suture line there was slight sanguinous staining noted on sponge stick.  However after this was removed no further bleeding was noted.  Very miniscule amount of sanguinous discharge.  Bimanual exam including rectovaginal exam was unremarkable repeat speculum exam after bimanual examination revealed no bleeding.    Cath urinalysis was taken after Betadine prep dipstick was negative for blood.        Assessment:       1. Postop check        Plan:       Patient Instructions   Discussed gradually increasing activity.      No sexual relations until 8 weeks postop.    Follow-up with me in 4 weeks.    She will notify if any significant bleeding occurs

## 2024-01-18 NOTE — PATIENT INSTRUCTIONS
Discussed gradually increasing activity.      No sexual relations until 8 weeks postop.    Follow-up with me in 4 weeks.    She will notify if any significant bleeding occurs

## 2024-05-22 NOTE — PATIENT INSTRUCTIONS
We have discussed in the office the possibility of later proceeding with hysterectomy.      However it is unsure whether she will have any more significant pain with uterine fibroids.  I have discussed the probability of degeneration of the midportion of a uterine fibroid causing the acute onset of pain which is now resolved.      We have tentatively scheduled her for hysterectomy in late November early December 2023.  However if no further pain occurs we may elect to simply cancel that surgery.   This was a shared visit with the NANCY. I reviewed and verified the documentation.

## 2024-07-15 ENCOUNTER — HOSPITAL ENCOUNTER (OUTPATIENT)
Dept: RADIOLOGY | Facility: HOSPITAL | Age: 44
Discharge: HOME | End: 2024-07-15
Attending: OBSTETRICS & GYNECOLOGY
Payer: COMMERCIAL

## 2024-07-15 VITALS — WEIGHT: 162 LBS | HEIGHT: 65 IN | BODY MASS INDEX: 26.99 KG/M2

## 2024-07-15 DIAGNOSIS — Z12.31 OTHER SCREENING MAMMOGRAM: ICD-10-CM

## 2024-07-15 PROCEDURE — 77063 BREAST TOMOSYNTHESIS BI: CPT | Mod: TC

## 2024-09-04 ENCOUNTER — OFFICE VISIT (OUTPATIENT)
Dept: VASCULAR SURGERY | Facility: CLINIC | Age: 44
End: 2024-09-04
Payer: COMMERCIAL

## 2024-09-04 VITALS
WEIGHT: 171.38 LBS | BODY MASS INDEX: 28.55 KG/M2 | HEART RATE: 70 BPM | RESPIRATION RATE: 14 BRPM | SYSTOLIC BLOOD PRESSURE: 132 MMHG | DIASTOLIC BLOOD PRESSURE: 92 MMHG | HEIGHT: 65 IN

## 2024-09-04 DIAGNOSIS — R60.0 EDEMA, LOWER EXTREMITY: ICD-10-CM

## 2024-09-04 DIAGNOSIS — I87.1 MAY-THURNER SYNDROME: Primary | ICD-10-CM

## 2024-09-04 DIAGNOSIS — I87.2 VENOUS INSUFFICIENCY: ICD-10-CM

## 2024-09-04 PROCEDURE — 99214 OFFICE O/P EST MOD 30 MIN: CPT | Mod: PBBFAC | Performed by: FAMILY MEDICINE

## 2024-09-04 PROCEDURE — 99999 PR PBB SHADOW E&M-EST. PATIENT-LVL IV: CPT | Mod: PBBFAC,,, | Performed by: FAMILY MEDICINE

## 2024-09-04 PROCEDURE — 99213 OFFICE O/P EST LOW 20 MIN: CPT | Mod: S$PBB,,, | Performed by: FAMILY MEDICINE

## 2024-09-04 NOTE — PROGRESS NOTES
VEIN CENTER CLINIC NOTE    Patient ID: Genoveva Ortiz is a 44 y.o. female.    I. HISTORY     Chief Complaint:   Chief Complaint   Patient presents with    Follow-up     Exam room 2.  1 year venous.        HPI: Genoveva Ortiz is a 44 y.o. female who presents today for a 1 year post ablation of the left great saphenous vein.  She has not followed up since her 11 day post ablation follow-up.  She states she got involved with some OBGYN problems she is having which led to a hysterectomy and was unable to follow-up until now.  She states that she continues to have a little bit of residual edema in the left lower extremity extends all the way up to her thigh.  She also continues to exercise daily but states that compression seems to aggravate her lower extremity.  But overall she denies pain in her left lower extremity.  She has history of May-Thurner syndrome with iliac stent placed by Dr. Calloway's couple years ago.  She has not had this stent interrogated.    Venous Disease Medical Necessity Documentation Initial Visit Date:  4/17/2023 Return Check Date:    Have you ever had a rupture or bleed from a varicose vein in your leg(s)?              [] Yes  [x] No   [] Yes   [] No   Have you ever been diagnosed with phlebitis, cellulitis, or inflammation in the area of the varicose veins of  your leg(s)?  [] Yes  [x] No    [] Yes   [] No   Do you have darkened or inflamed skin on your legs?   [] Yes   [x] No   [] Yes   [] No   Do you have leg swelling?     [x] Yes   [] No   [] Yes   [] No   Do you have leg pain?   [x] Yes   [] No   [] Yes   [] No   If yes, describe the type of pain?    []   Stabbing []  Radiating []  Aching   [x]  Tightness []  Throbbing               []  Burning []  Cramping              Do you have leg discomfort?   [x] Yes   [] No   [] Yes   [] No   If yes, describe the type of discomfort?    [x]  Heaviness [x]  Fullness   []  Restlessness [x] Tired/Fatigued [] Itching              Have  you ever worn compression hose?   [] Yes   [x] No   [] Yes   [] No   If yes, how long?           Do you elevate your legs while sitting?   [] Yes   [x] No   [] Yes   [] No   Does venous disease (varicose veins, ulcers, skin changes, leg pain/swelling) interfere with your daily life?  If yes, check activities you are limited or unable to do.    []  Shower  []   Walk  []  Exercise  [] Play with children/grandchildren  []  Shop [] Work [] Stand for any period of time [] Sleep                               [x] Sitting for an extended period of time.           [x] Yes   [] No   [] Yes   [] No   Do you exercise/have you tried to exercise (i.e.  Walk our participate in a regular exercise routine)?  [x] Yes  [] No   [] Yes   [] No   BMI   28.9           Past Medical History:   Diagnosis Date    May-Thurner syndrome         Past Surgical History:   Procedure Laterality Date    ABLATION      AUGMENTATION OF BREAST      CYSTOSCOPY N/A 12/18/2023    Procedure: CYSTOSCOPY;  Surgeon: Ac Flaherty MD;  [email or web address] OR;  Service: OB/GYN;  Laterality: N/A;    DILATION AND CURETTAGE OF UTERUS      HYSTERECTOMY      Knee scope      1997    RADIOFREQUENCY ABLATION Left 05/19/2023    Procedure: Left GSV RF Ablation;  Surgeon: Bakari Galicia DO;  Location: [email or web address] OR;  Service: Peripheral Vascular;  Laterality: Left;    ROBOT-ASSISTED LAPAROSCOPIC ABDOMINAL HYSTERECTOMY USING DA FRIDA XI N/A 12/18/2023    Procedure: XI ROBOTIC HYSTERECTOMY;  Surgeon: Ac Flaherty MD;  Location: [facility];  Service: OB/GYN;  Laterality: N/A;    SALPINGECTOMY Bilateral 12/2016    Stent placement femoral vein Dr. Novak  07/16/2020    TONSILLECTOMY  1989    TUBAL LIGATION      VAGINAL DELIVERY      x 3    VASCULAR SURGERY Left 10/21/2021    Stent Balloon Dr. Lindo @ [facility]       Social History     Tobacco Use   Smoking Status Never   Smokeless Tobacco Never         Current Outpatient Medications:     aspirin 81 MG Chew,  Take 81 mg by mouth once daily., Disp: , Rfl:     latanoprost 0.005 % ophthalmic solution, Place 1 drop into both eyes every evening., Disp: , Rfl:     potassium chloride SA (K-DUR,KLOR-CON) 20 MEQ tablet, Take 1 tablet (20 mEq total) by mouth once daily., Disp: 30 tablet, Rfl: 0    traMADoL (ULTRAM) 50 mg tablet, Take 1 tablet (50 mg total) by mouth every 6 (six) hours as needed for Pain (Postoperative pain)., Disp: 8 tablet, Rfl: 0  No current facility-administered medications for this visit.    Facility-Administered Medications Ordered in Other Visits:     LIDOcaine-EPINEPHrine 1%-1:100,000 30 mL, LIDOcaine HCL 10 mg/ml (1%) 20 mL, sodium bicarbonate 10 mL in sodium chloride 0.9% 500 mL solution, , MISCELLANEOUS, Once, Bakari Galicia, DO    Review of Systems   Constitutional:  Negative for activity change, chills, diaphoresis, fatigue and fever.   Respiratory:  Negative for cough and shortness of breath.    Cardiovascular:  Positive for leg swelling. Negative for chest pain and claudication.        Hyperpigmentation LE   Gastrointestinal:  Negative for nausea and vomiting.   Musculoskeletal:  Positive for leg pain. Negative for joint swelling.   Integumentary:  Negative for rash and wound.   Neurological:  Negative for weakness and numbness.          II. PHYSICAL EXAM     Physical Exam  Constitutional:       General: She is awake. She is not in acute distress.     Appearance: Normal appearance. She is not ill-appearing or toxic-appearing.   HENT:      Head: Normocephalic and atraumatic.   Eyes:      Extraocular Movements: Extraocular movements intact.      Conjunctiva/sclera: Conjunctivae normal.      Pupils: Pupils are equal, round, and reactive to light.   Neck:      Vascular: No carotid bruit or JVD.   Cardiovascular:      Rate and Rhythm: Normal rate and regular rhythm.      Pulses:           Dorsalis pedis pulses are detected w/ Doppler on the right side and detected w/ Doppler on the left side.         Posterior tibial pulses are detected w/ Doppler on the right side and detected w/ Doppler on the left side.      Heart sounds: No murmur heard.  Pulmonary:      Effort: Pulmonary effort is normal. No respiratory distress.      Breath sounds: No stridor. No wheezing, rhonchi or rales.   Musculoskeletal:         General: No swelling, tenderness or deformity.      Right lower leg: No edema.      Left lower le+ Edema present.      Comments: No evidence of cellulitis, weeping or open ulceration.   Feet:      Comments: Triphasic handheld dopplerable pulses of the bilateral dorsalis pedis and posterior tibial arteries.  Skin:     General: Skin is warm.      Capillary Refill: Capillary refill takes less than 2 seconds.      Coloration: Skin is not ashen.      Findings: No bruising, erythema, lesion, rash or wound.   Neurological:      Mental Status: She is alert and oriented to person, place, and time.      Motor: No weakness.   Psychiatric:         Speech: Speech normal.         Behavior: Behavior normal. Behavior is cooperative.         Reticular/Spider veins noted:  RLE: none  LLE: none    Varicose veins noted:  RLE: none  LLE:  none    CEAP Classification  Clinical Signs: Class 3 - Edema  Etiologic Classification: Primary  Anatomic distribution: Superficial  Pathophysiologic dysfunction: Reflux      Venous Clinical Severity Score  Pain:3=Daily, severe limiting activities or requiring regular use of analgesics  Varicose Veins: 0=None  Venous Edema: 2=Afternoon edema, above ankle  Pigmentation: 0=None or focal, low intensity (tan)  Inflammation: 0=None  Induration: 0=None  Number of Active Ulcers: 0=0  Active Ulceration, Duration: 0=None  Active Ulcer Size: 0=None  Compressive Therapy: 3=Full compliance, stockings + elevation  Total Score: 8       III. ASSESSMENT & PLAN (MEDICAL DECISION MAKING)     1. May-Thurner syndrome    2. Venous insufficiency    3. Edema, lower extremity        Assessment/Diagnosis and  Plan:  Patient seems to be doing well post ablation and encouraged to continue with leg exercise and leg elevation when appropriate.  Patient is intolerant to compression.  Today, I will order a left iliac ultrasound/stent interrogation and see the patient back with results.  She states that her OBGYN may also want to start hormone therapy but would 1st need clearance secondary to her iliac stent.        Bakari Galicia, DO

## 2024-09-18 ENCOUNTER — OFFICE VISIT (OUTPATIENT)
Dept: VASCULAR SURGERY | Facility: CLINIC | Age: 44
End: 2024-09-18
Payer: COMMERCIAL

## 2024-09-18 ENCOUNTER — HOSPITAL ENCOUNTER (OUTPATIENT)
Dept: RADIOLOGY | Facility: HOSPITAL | Age: 44
Discharge: HOME | End: 2024-09-18
Attending: FAMILY MEDICINE
Payer: COMMERCIAL

## 2024-09-18 VITALS
BODY MASS INDEX: 28.32 KG/M2 | HEIGHT: 65 IN | WEIGHT: 170 LBS | DIASTOLIC BLOOD PRESSURE: 61 MMHG | SYSTOLIC BLOOD PRESSURE: 121 MMHG | RESPIRATION RATE: 20 BRPM | HEART RATE: 63 BPM

## 2024-09-18 DIAGNOSIS — I87.1 MAY-THURNER SYNDROME: ICD-10-CM

## 2024-09-18 DIAGNOSIS — R60.0 EDEMA, LOWER EXTREMITY: ICD-10-CM

## 2024-09-18 DIAGNOSIS — I87.1 MAY-THURNER SYNDROME: Primary | ICD-10-CM

## 2024-09-18 DIAGNOSIS — I87.2 VENOUS INSUFFICIENCY: ICD-10-CM

## 2024-09-18 PROCEDURE — 93979 VASCULAR STUDY: CPT | Mod: TC

## 2024-09-18 PROCEDURE — 99999 PR PBB SHADOW E&M-EST. PATIENT-LVL IV: CPT | Mod: PBBFAC,,, | Performed by: FAMILY MEDICINE

## 2024-09-18 PROCEDURE — 99214 OFFICE O/P EST MOD 30 MIN: CPT | Mod: S$PBB,,, | Performed by: FAMILY MEDICINE

## 2024-09-18 PROCEDURE — 99214 OFFICE O/P EST MOD 30 MIN: CPT | Mod: PBBFAC | Performed by: FAMILY MEDICINE

## 2024-09-18 NOTE — PROGRESS NOTES
VEIN CENTER CLINIC NOTE    Patient ID: Genoveva Ortiz is a 44 y.o. female.    I. HISTORY     Chief Complaint:   Chief Complaint   Patient presents with    Follow-up     TEST RESULTS; LEFT ILIAC STENT CHECK         HPI: Genoveva Ortiz is a 44 y.o. female who presents today for follow-up and a left iliac ultrasound and stent check.  Ultrasound today shows a widely patent iliac stent without evidence of stenosis or narrowing.    The patient presented recently for a 1 year post ablation of the left great saphenous vein.  She has not followed up since her 11 day post ablation follow-up.  She states she got involved with some OBGYN problems she is having which led to a hysterectomy and was unable to follow-up until now.  She states that she continues to have a little bit of residual edema in the left lower extremity extends all the way up to her thigh.  She also continues to exercise daily but states that compression seems to aggravate her lower extremity.  But overall she denies pain in her left lower extremity.  She has history of May-Thurner syndrome with iliac stent placed by Dr. Shah's couple years ago.  She has not had this stent interrogated.    Venous Disease Medical Necessity Documentation Initial Visit Date:  4/17/2023 Return Check Date:    Have you ever had a rupture or bleed from a varicose vein in your leg(s)?              [] Yes  [x] No   [] Yes   [] No   Have you ever been diagnosed with phlebitis, cellulitis, or inflammation in the area of the varicose veins of  your leg(s)?  [] Yes  [x] No    [] Yes   [] No   Do you have darkened or inflamed skin on your legs?   [] Yes   [x] No   [] Yes   [] No   Do you have leg swelling?     [x] Yes   [] No   [] Yes   [] No   Do you have leg pain?   [x] Yes   [] No   [] Yes   [] No   If yes, describe the type of pain?    []   Stabbing []  Radiating []  Aching   [x]  Tightness []  Throbbing               []  Burning []  Cramping              Do you  have leg discomfort?   [x] Yes   [] No   [] Yes   [] No   If yes, describe the type of discomfort?    [x]  Heaviness [x]  Fullness   []  Restlessness [x] Tired/Fatigued [] Itching              Have you ever worn compression hose?   [] Yes   [x] No   [] Yes   [] No   If yes, how long?           Do you elevate your legs while sitting?   [] Yes   [x] No   [] Yes   [] No   Does venous disease (varicose veins, ulcers, skin changes, leg pain/swelling) interfere with your daily life?  If yes, check activities you are limited or unable to do.    []  Shower  []   Walk  []  Exercise  [] Play with children/grandchildren  []  Shop [] Work [] Stand for any period of time [] Sleep                               [x] Sitting for an extended period of time.           [x] Yes   [] No   [] Yes   [] No   Do you exercise/have you tried to exercise (i.e.  Walk our participate in a regular exercise routine)?  [x] Yes  [] No   [] Yes   [] No   BMI   28.9           Past Medical History:   Diagnosis Date    May-Thurner syndrome         Past Surgical History:   Procedure Laterality Date    ABLATION      AUGMENTATION OF BREAST      CYSTOSCOPY N/A 12/18/2023    Procedure: CYSTOSCOPY;  Surgeon: Ac Flaherty MD;  Location: [facility];  Service: OB/GYN;  Laterality: N/A;    DILATION AND CURETTAGE OF UTERUS      HYSTERECTOMY      Knee scope      1997    RADIOFREQUENCY ABLATION Left 05/19/2023    Procedure: Left GSV RF Ablation;  Surgeon: Bakari Galicia DO;  Location: [facility] OR;  Service: Peripheral Vascular;  Laterality: Left;    ROBOT-ASSISTED LAPAROSCOPIC ABDOMINAL HYSTERECTOMY USING DA FRIDA XI N/A 12/18/2023    Procedure: XI ROBOTIC HYSTERECTOMY;  Surgeon: Ac Flaherty MD;  Location: [facility] OR;  Service: OB/GYN;  Laterality: N/A;    SALPINGECTOMY Bilateral 12/2016    Stent placement femoral vein Dr. Novak  07/16/2020    TONSILLECTOMY  1989    TUBAL LIGATION      VAGINAL DELIVERY      x 3    VASCULAR SURGERY Left  10/21/2021    Stent Balloon Dr. Lindo @ [facility]       Social History     Tobacco Use   Smoking Status Never   Smokeless Tobacco Never         Current Outpatient Medications:     aspirin 81 MG Chew, Take 81 mg by mouth once daily., Disp: , Rfl:     latanoprost 0.005 % ophthalmic solution, Place 1 drop into both eyes every evening., Disp: , Rfl:     potassium chloride SA (K-DUR,KLOR-CON) 20 MEQ tablet, Take 1 tablet (20 mEq total) by mouth once daily., Disp: 30 tablet, Rfl: 0    traMADoL (ULTRAM) 50 mg tablet, Take 1 tablet (50 mg total) by mouth every 6 (six) hours as needed for Pain (Postoperative pain)., Disp: 8 tablet, Rfl: 0  No current facility-administered medications for this visit.    Facility-Administered Medications Ordered in Other Visits:     LIDOcaine-EPINEPHrine 1%-1:100,000 30 mL, LIDOcaine HCL 10 mg/ml (1%) 20 mL, sodium bicarbonate 10 mL in sodium chloride 0.9% 500 mL solution, , MISCELLANEOUS, Once, Bakari Galicia, DO    Review of Systems   Constitutional:  Negative for activity change, chills, diaphoresis, fatigue and fever.   Respiratory:  Negative for cough and shortness of breath.    Cardiovascular:  Positive for leg swelling. Negative for chest pain and claudication.        Hyperpigmentation LE   Gastrointestinal:  Negative for nausea and vomiting.   Musculoskeletal:  Positive for leg pain. Negative for joint swelling.   Integumentary:  Negative for rash and wound.   Neurological:  Negative for weakness and numbness.        II. PHYSICAL EXAM     Physical Exam  Constitutional:       General: She is awake. She is not in acute distress.     Appearance: Normal appearance. She is not ill-appearing or toxic-appearing.   HENT:      Head: Normocephalic and atraumatic.   Eyes:      Extraocular Movements: Extraocular movements intact.      Conjunctiva/sclera: Conjunctivae normal.      Pupils: Pupils are equal, round, and reactive to light.   Neck:      Vascular: No carotid bruit or JVD.    Cardiovascular:      Rate and Rhythm: Normal rate and regular rhythm.      Pulses:           Dorsalis pedis pulses are detected w/ Doppler on the right side and detected w/ Doppler on the left side.        Posterior tibial pulses are detected w/ Doppler on the right side and detected w/ Doppler on the left side.      Heart sounds: No murmur heard.  Pulmonary:      Effort: Pulmonary effort is normal. No respiratory distress.      Breath sounds: No stridor. No wheezing, rhonchi or rales.   Musculoskeletal:         General: No swelling, tenderness or deformity.      Right lower leg: No edema.      Left lower le+ Edema present.      Comments: No evidence of cellulitis, weeping or open ulceration.   Feet:      Comments: Triphasic handheld dopplerable pulses of the bilateral dorsalis pedis and posterior tibial arteries.  Skin:     General: Skin is warm.      Capillary Refill: Capillary refill takes less than 2 seconds.      Coloration: Skin is not ashen.      Findings: No bruising, erythema, lesion, rash or wound.   Neurological:      Mental Status: She is alert and oriented to person, place, and time.      Motor: No weakness.   Psychiatric:         Speech: Speech normal.         Behavior: Behavior normal. Behavior is cooperative.         Reticular/Spider veins noted:  RLE: none  LLE: none    Varicose veins noted:  RLE: none  LLE:  none    CEAP Classification  Clinical Signs: Class 3 - Edema  Etiologic Classification: Primary  Anatomic distribution: Superficial  Pathophysiologic dysfunction: Reflux      Venous Clinical Severity Score  Pain:3=Daily, severe limiting activities or requiring regular use of analgesics  Varicose Veins: 0=None  Venous Edema: 2=Afternoon edema, above ankle  Pigmentation: 0=None or focal, low intensity (tan)  Inflammation: 0=None  Induration: 0=None  Number of Active Ulcers: 0=0  Active Ulceration, Duration: 0=None  Active Ulcer Size: 0=None  Compressive Therapy: 3=Full compliance, stockings  + elevation  Total Score: 8       III. ASSESSMENT & PLAN (MEDICAL DECISION MAKING)     1. May-Thurner syndrome    2. Venous insufficiency    3. Edema, lower extremity        Assessment/Diagnosis and Plan:  Patient seems to be doing well post ablation and encouraged to continue with leg exercise and leg elevation when appropriate.  Patient is intolerant to compression.  We will follow up in 6 months and consider a repeat reflux study if symptoms persist or worsen.        Bakari Galicia, DO

## (undated) DEVICE — KIT ROBOTIC RUSH

## (undated) DEVICE — DRAPE ADPT RUMI II ADVINCULA

## (undated) DEVICE — SUT 4-0 VICRYL / FS-2

## (undated) DEVICE — BANDAGE ELAS SOFTWRAP ST 6X5YD

## (undated) DEVICE — Device

## (undated) DEVICE — GLOVE PROTEXIS PI SYN SURG 6.5

## (undated) DEVICE — TUBING FOR TUMESCENT INFILTRATION PUMP

## (undated) DEVICE — NDL DRIVER SUTURECUT MEGA XI

## (undated) DEVICE — GLOVE 6.5 PROTEXIS PI BLUE

## (undated) DEVICE — SUT ETHILON 3-0 PS2 18 BLK

## (undated) DEVICE — GLOVE PROTEXIS PI SYN SURG 7.5

## (undated) DEVICE — TUBING INSUFFLATION HEATED

## (undated) DEVICE — FORCEP FENESTRATED BIPOLAR

## (undated) DEVICE — BANDAGE COMPR 6IN 5.8YD

## (undated) DEVICE — PROGRASP DA VINCI

## (undated) DEVICE — GOWN POLY REINF BRTH SLV XL

## (undated) DEVICE — SEAL UNIVERSAL 5MM-8MM XI

## (undated) DEVICE — TIP RUMI GREEN DISPOSABLE6.7MM

## (undated) DEVICE — COVER PROXIMA MAYO STAND

## (undated) DEVICE — SUT VICRYL 0 CT-2 27 DYE

## (undated) DEVICE — BNDG COFLEX FOAM LF2 ST 4X5YD

## (undated) DEVICE — SEALER VESSEL EXTEND

## (undated) DEVICE — APPLICATOR CHLORAPREP ORN 26ML

## (undated) DEVICE — SOL NACL IRR 1000ML BTL

## (undated) DEVICE — DRAPE ARM DAVINCI XI

## (undated) DEVICE — MATRIX HEMOSTATIC FLOSEAL 5ML

## (undated) DEVICE — SOL NACL IRR 3000ML

## (undated) DEVICE — TAPE DRAPE STRIP CLSR 4.5X24IN

## (undated) DEVICE — OBTURATOR BLADELESS 8MM XI CLR

## (undated) DEVICE — SCISSOR HOT SHEARS XI

## (undated) DEVICE — ADHESIVE MASTISOL VIAL 48/BX

## (undated) DEVICE — WARMER BLUE HEAT SCOPE 3-12MM

## (undated) DEVICE — KIT ACCESS PROCEDURE PACK 7CM

## (undated) DEVICE — STRIP MEDI WND CLSR 1/2X4IN

## (undated) DEVICE — OCCLUDER COLPO-PNEUMO STERILE

## (undated) DEVICE — COVER SNAP KAP 26IN

## (undated) DEVICE — BANDAGE GAUZE COT STRL 4.5X4.1

## (undated) DEVICE — GLOVE PROTEXIS PI SYN SURG 6.0

## (undated) DEVICE — COVER TIP CURVED SCISSORS XI

## (undated) DEVICE — IRRIGATOR ENDOSCOPY DISP.

## (undated) DEVICE — GLOVE PROTEXIS PI SYN SURG 7

## (undated) DEVICE — CATH VENCLOSE FR ABLAT 100CM

## (undated) DEVICE — GLOVE PROTEXIS PI SYN SURG 8.0

## (undated) DEVICE — APPLICATOR FLOSEAL ENDO 35CM

## (undated) DEVICE — SUT VICRYL PLUS 1 CTX 36IN